# Patient Record
Sex: MALE | Race: WHITE | NOT HISPANIC OR LATINO | Employment: UNEMPLOYED | URBAN - METROPOLITAN AREA
[De-identification: names, ages, dates, MRNs, and addresses within clinical notes are randomized per-mention and may not be internally consistent; named-entity substitution may affect disease eponyms.]

---

## 2018-10-17 ENCOUNTER — HOSPITAL ENCOUNTER (EMERGENCY)
Facility: HOSPITAL | Age: 23
Discharge: HOME | End: 2018-10-17
Attending: EMERGENCY MEDICINE
Payer: COMMERCIAL

## 2018-10-17 VITALS
BODY MASS INDEX: 33.53 KG/M2 | HEART RATE: 79 BPM | HEIGHT: 77 IN | RESPIRATION RATE: 20 BRPM | OXYGEN SATURATION: 97 % | WEIGHT: 284 LBS | TEMPERATURE: 97.4 F | SYSTOLIC BLOOD PRESSURE: 128 MMHG | DIASTOLIC BLOOD PRESSURE: 90 MMHG

## 2018-10-17 DIAGNOSIS — Z00.8 EVALUATION BY PSYCHIATRIC SERVICE REQUIRED: Primary | ICD-10-CM

## 2018-10-17 DIAGNOSIS — F32.A DEPRESSION: ICD-10-CM

## 2018-10-17 LAB
AMPHETAMINES SERPL QL SCN: NEGATIVE
BARBITURATES UR QL: NEGATIVE
BENZODIAZ UR QL: NEGATIVE
COCAINE UR QL: NEGATIVE
ETHANOL SERPL-MCNC: <10 MG/DL
METHADONE UR QL: NEGATIVE
OPIATES UR QL SCN: NEGATIVE
PCP UR QL: NEGATIVE
THC UR QL: NEGATIVE

## 2018-10-17 PROCEDURE — 36415 COLL VENOUS BLD VENIPUNCTURE: CPT | Performed by: EMERGENCY MEDICINE

## 2018-10-17 PROCEDURE — 80307 DRUG TEST PRSMV CHEM ANLYZR: CPT | Performed by: EMERGENCY MEDICINE

## 2018-10-17 PROCEDURE — 80320 DRUG SCREEN QUANTALCOHOLS: CPT | Performed by: EMERGENCY MEDICINE

## 2018-10-17 PROCEDURE — 99284 EMERGENCY DEPT VISIT MOD MDM: CPT

## 2018-10-17 NOTE — ED PROVIDER NOTES
History  Chief Complaint   Patient presents with    Psychiatric Evaluation     I had court today and for me to get out of halfway, I needed to get evaluated for mental health  Patient presents the emergency department for psychiatric evaluation he reports that he was locked up today and 1 condition of his release was that he needed to present to the hospital and determine if he needed psychiatric treatment  The patient states he does not feel he needs psychiatric treatment at this time he reports feeling psychiatrically stable he denies any active severe depression anxiety suicidal or homicidal ideation at this time  History provided by:  Patient and relative  Psychiatric Evaluation   Presenting symptoms: no suicidal thoughts    Patient accompanied by:  Parent  Degree of incapacity (severity):  Mild  Onset quality:  Sudden  Duration:  1 day  Timing:  Constant  Chronicity:  New  Associated symptoms: no abdominal pain, no appetite change, no chest pain, no fatigue and no headaches        None       History reviewed  No pertinent past medical history  History reviewed  No pertinent surgical history  History reviewed  No pertinent family history  I have reviewed and agree with the history as documented  Social History   Substance Use Topics    Smoking status: Never Smoker    Smokeless tobacco: Never Used    Alcohol use No        Review of Systems   Constitutional: Negative for activity change, appetite change, chills, fatigue and fever  HENT: Negative for congestion, ear pain, rhinorrhea and sore throat  Eyes: Negative for discharge, redness and visual disturbance  Respiratory: Negative for cough, chest tightness, shortness of breath and wheezing  Cardiovascular: Negative for chest pain and palpitations  Gastrointestinal: Negative for abdominal pain, constipation, diarrhea, nausea and vomiting  Endocrine: Negative for polydipsia and polyuria     Genitourinary: Negative for difficulty urinating, dysuria, frequency, hematuria and urgency  Musculoskeletal: Negative for arthralgias and myalgias  Skin: Negative for color change, pallor and rash  Neurological: Negative for dizziness, weakness, light-headedness, numbness and headaches  Hematological: Negative for adenopathy  Does not bruise/bleed easily  Psychiatric/Behavioral: Negative for suicidal ideas  All other systems reviewed and are negative  Physical Exam  Physical Exam   Constitutional: He is oriented to person, place, and time  He appears well-developed and well-nourished  HENT:   Head: Normocephalic and atraumatic  Right Ear: External ear normal    Left Ear: External ear normal    Nose: Nose normal    Mouth/Throat: Oropharynx is clear and moist    Eyes: Pupils are equal, round, and reactive to light  Conjunctivae and EOM are normal    Neck: Normal range of motion  Neck supple  Cardiovascular: Normal rate, regular rhythm, normal heart sounds and intact distal pulses  Pulmonary/Chest: Effort normal and breath sounds normal  No respiratory distress  He has no wheezes  He has no rales  He exhibits no tenderness  Abdominal: Soft  Bowel sounds are normal  He exhibits no distension  There is no tenderness  There is no guarding  Musculoskeletal: Normal range of motion  Neurological: He is alert and oriented to person, place, and time  No cranial nerve deficit or sensory deficit  Skin: Skin is warm and dry  Psychiatric: He has a normal mood and affect  Nursing note and vitals reviewed        Vital Signs  ED Triage Vitals [10/17/18 1929]   Temperature Pulse Respirations Blood Pressure SpO2   (!) 97 4 °F (36 3 °C) 79 20 128/90 97 %      Temp Source Heart Rate Source Patient Position - Orthostatic VS BP Location FiO2 (%)   Temporal Monitor Sitting Left arm --      Pain Score       --           Vitals:    10/17/18 1929   BP: 128/90   Pulse: 79   Patient Position - Orthostatic VS: Sitting       Visual Acuity      ED Medications  Medications - No data to display    Diagnostic Studies  Results Reviewed     Procedure Component Value Units Date/Time    Ethanol [[id number]]  (Normal) Collected:  10/17/18 2008    Lab Status:  Final result Specimen:  Blood Updated:  10/17/18 2034     Ethanol Lvl <10 mg/dL     Rapid drug screen, urine [[id number]]  (Normal) Collected:  10/17/18 2008    Lab Status:  Final result Specimen:  Urine Updated:  10/17/18 2024     Amph/Meth UR Negative     Barbiturate Ur Negative     Benzodiazepine Urine Negative     Cocaine Urine Negative     Methadone Urine Negative     Opiate Urine Negative     PCP Ur Negative     THC Urine Negative    Narrative:         FOR MEDICAL PURPOSES ONLY  IF CONFIRMATION NEEDED PLEASE CONTACT THE LAB WITHIN 5 DAYS  Drug Screen Cutoff Levels:  AMPHETAMINE/METHAMPHETAMINES  1000 ng/mL  BARBITURATES     200 ng/mL  BENZODIAZEPINES     200 ng/mL  COCAINE      300 ng/mL  METHADONE      300 ng/mL  OPIATES      300 ng/mL  PHENCYCLIDINE     25 ng/mL  THC       50 ng/mL                 No orders to display              Procedures  Procedures       Phone Contacts  ED Phone Contact    ED Course                               MDM  Number of Diagnoses or Management Options  Depression: established and improving  Evaluation by psychiatric service required: new and requires workup  Diagnosis management comments: Patient has no grounds for 302 present he was seen and evaluated by crisis and provided with referrals for outpatient psychiatric care and cleared for discharge by crisis no grounds for inpatient psychiatric treatment or 302 are present at this time the patient does not wish to seek inpatient psychiatric treatment at this time patient and family are in agreement with plan and will follow up with primary care physician and outpatient psychiatric referrals provided return precautions and anticipatory guidance discussed           Amount and/or Complexity of Data Reviewed  Clinical lab tests: ordered and reviewed      CritCare Time    Disposition  Final diagnoses:   Evaluation by psychiatric service required   Depression     Time reflects when diagnosis was documented in both MDM as applicable and the Disposition within this note     Time User Action Codes Description Comment    10/17/2018  8:30 PM Chevy Piper Add [Z00 8] Evaluation by psychiatric service required     10/17/2018  8:49 PM Chevy Piper Add [F32 9] Depression       ED Disposition     ED Disposition Condition Comment    Discharge  Arabella Shea discharge to home/self care  Condition at discharge: Stable        Follow-up Information     Follow up With Specialties Details Why Contact Info    Nadira Mcpherson DO Family Medicine Schedule an appointment as soon as possible for a visit in 2 days  [address] [facility] [address] [facility] [phone]            There are no discharge medications for this patient  No discharge procedures on file      ED Provider  Electronically Signed by           Maricruz Hernandez DO  10/17/18 7546

## 2018-10-18 NOTE — DISCHARGE INSTRUCTIONS
Depression   WHAT YOU NEED TO KNOW:   Depression is a medical condition that causes feelings of sadness or hopelessness that do not go away  Depression may cause you to lose interest in things you used to enjoy  These feelings may interfere with your daily life  DISCHARGE INSTRUCTIONS:   Call 911 for any of the following:   · You think about harming yourself or someone else  Contact your healthcare provider if:   · Your symptoms do not improve  · You cannot make it to your next appointment  · You have new symptoms  · You have questions or concerns about your condition or care  Medicines:   · Antidepressants  may be given to improve or balance your mood  You may need to take this medicine for several weeks before you begin to feel better  · Take your medicine as directed  Contact your healthcare provider if you think your medicine is not helping or if you have side effects  Tell him of her if you are allergic to any medicine  Keep a list of the medicines, vitamins, and herbs you take  Include the amounts, and when and why you take them  Bring the list or the pill bottles to follow-up visits  Carry your medicine list with you in case of an emergency  Therapy  may be used to treat your depression  A therapist will help you learn to cope with your thoughts and feelings  This can be done alone or in a group  It may also be done with family members or a significant other  Self-care:   · Get regular physical activity  Try to exercise for 30 minutes, 3 to 5 days a week  Work with your healthcare provider to develop an exercise plan that you enjoy  Physical activity may improve your symptoms  · Get enough sleep  Create a routine to help you relax before bed  You can listen to music, read, or do yoga  Try to go to bed and wake up at the same time every day  Sleep is important for emotional health  · Eat a variety of healthy foods from all of the food groups    A healthy meal plan is low in fat, salt, and added sugar  Ask your healthcare provider for more information about a meal plan that is right for you  · Do not drink alcohol or use drugs  Alcohol and drugs can make your symptoms worse  Follow up with your healthcare provider as directed: Your healthcare provider will monitor your progress at follow-up visits  He or she will also monitor your medicine if you take antidepressants  Your healthcare provider will ask if the medicine is helping  Tell him or her about any side effects or problems you may have with your medicine  The type or amount of medicine may need to be changed  Write down your questions so you remember to ask them during your visits  © 2017 [employer] Information is for End User's use only and may not be sold, redistributed or otherwise used for commercial purposes  All illustrations and images included in CareNotes® are the copyrighted property of [employer]  or Julian Hernandez  The above information is an  only  It is not intended as medical advice for individual conditions or treatments  Talk to your doctor, nurse or pharmacist before following any medical regimen to see if it is safe and effective for you

## 2018-10-18 NOTE — ED NOTES
Crisis met with Patient and his parents in [facility] ED #9  Patient is a 27 y/o male released [facility] penitentiary earlier today and was ordered to come to the ED for a crisis evaluation  Patient is denying any suicidal/homicidal ideations and denies any hallucinations  Patient denied any depression/anxiety and has been eating/sleeping well  Patient has no other symptoms/complaints at this time  He was incarcerated for 11 days on charges of terroristical threats  He stated he was feeling threatened at the time of the incident  Patient has a history of hospitalizations  His last admit was in 2015 at [facility]   Patient contracted for safety and is requesting to go home with outpatient information  His parents are in agreement with this plan  Case reviewed with ED MD who is also in agreement with discharge

## 2019-02-22 ENCOUNTER — TRANSCRIBE ORDERS (OUTPATIENT)
Dept: ADMINISTRATIVE | Facility: HOSPITAL | Age: 24
End: 2019-02-22

## 2019-09-14 ENCOUNTER — HOSPITAL ENCOUNTER (EMERGENCY)
Facility: HOSPITAL | Age: 24
Discharge: HOME | End: 2019-09-14
Attending: EMERGENCY MEDICINE | Admitting: EMERGENCY MEDICINE
Payer: COMMERCIAL

## 2019-09-14 ENCOUNTER — APPOINTMENT (EMERGENCY)
Dept: CT IMAGING | Facility: HOSPITAL | Age: 24
End: 2019-09-14
Payer: COMMERCIAL

## 2019-09-14 VITALS
BODY MASS INDEX: 35.42 KG/M2 | OXYGEN SATURATION: 96 % | RESPIRATION RATE: 19 BRPM | DIASTOLIC BLOOD PRESSURE: 77 MMHG | HEART RATE: 101 BPM | HEIGHT: 77 IN | TEMPERATURE: 96.9 F | SYSTOLIC BLOOD PRESSURE: 141 MMHG | WEIGHT: 300 LBS

## 2019-09-14 DIAGNOSIS — R11.10 VOMITING: Primary | ICD-10-CM

## 2019-09-14 DIAGNOSIS — N17.9 AKI (ACUTE KIDNEY INJURY) (HCC): ICD-10-CM

## 2019-09-14 DIAGNOSIS — E86.0 DEHYDRATION: ICD-10-CM

## 2019-09-14 DIAGNOSIS — R19.7 DIARRHEA: ICD-10-CM

## 2019-09-14 LAB
ALBUMIN SERPL BCP-MCNC: 3.2 G/DL (ref 3.5–5)
ALBUMIN SERPL BCP-MCNC: 4.2 G/DL (ref 3.5–5)
ALP SERPL-CCNC: 68 U/L (ref 46–116)
ALP SERPL-CCNC: 92 U/L (ref 46–116)
ALT SERPL W P-5'-P-CCNC: 21 U/L (ref 12–78)
ALT SERPL W P-5'-P-CCNC: 28 U/L (ref 12–78)
AMPHETAMINES SERPL QL SCN: NEGATIVE
ANION GAP SERPL CALCULATED.3IONS-SCNC: 14 MMOL/L (ref 4–13)
ANION GAP SERPL CALCULATED.3IONS-SCNC: 7 MMOL/L (ref 4–13)
AST SERPL W P-5'-P-CCNC: 15 U/L (ref 5–45)
AST SERPL W P-5'-P-CCNC: 21 U/L (ref 5–45)
BACTERIA UR QL AUTO: ABNORMAL /HPF
BARBITURATES UR QL: NEGATIVE
BASOPHILS # BLD AUTO: 0.03 THOUSANDS/ΜL (ref 0–0.1)
BASOPHILS # BLD AUTO: 0.04 THOUSANDS/ΜL (ref 0–0.1)
BASOPHILS NFR BLD AUTO: 0 % (ref 0–1)
BASOPHILS NFR BLD AUTO: 0 % (ref 0–1)
BENZODIAZ UR QL: NEGATIVE
BILIRUB SERPL-MCNC: 0.3 MG/DL (ref 0.2–1)
BILIRUB SERPL-MCNC: 0.4 MG/DL (ref 0.2–1)
BILIRUB UR QL STRIP: NEGATIVE
BUN SERPL-MCNC: 29 MG/DL (ref 5–25)
BUN SERPL-MCNC: 32 MG/DL (ref 5–25)
CALCIUM SERPL-MCNC: 8.1 MG/DL (ref 8.3–10.1)
CALCIUM SERPL-MCNC: 9.8 MG/DL (ref 8.3–10.1)
CHLORIDE SERPL-SCNC: 104 MMOL/L (ref 100–108)
CHLORIDE SERPL-SCNC: 110 MMOL/L (ref 100–108)
CLARITY UR: CLEAR
CO2 SERPL-SCNC: 21 MMOL/L (ref 21–32)
CO2 SERPL-SCNC: 25 MMOL/L (ref 21–32)
COCAINE UR QL: NEGATIVE
COLOR UR: YELLOW
CREAT SERPL-MCNC: 1.83 MG/DL (ref 0.6–1.3)
CREAT SERPL-MCNC: 2.05 MG/DL (ref 0.6–1.3)
EOSINOPHIL # BLD AUTO: 0.03 THOUSAND/ΜL (ref 0–0.61)
EOSINOPHIL # BLD AUTO: 0.18 THOUSAND/ΜL (ref 0–0.61)
EOSINOPHIL NFR BLD AUTO: 0 % (ref 0–6)
EOSINOPHIL NFR BLD AUTO: 2 % (ref 0–6)
ERYTHROCYTE [DISTWIDTH] IN BLOOD BY AUTOMATED COUNT: 13.8 % (ref 11.6–15.1)
ERYTHROCYTE [DISTWIDTH] IN BLOOD BY AUTOMATED COUNT: 13.8 % (ref 11.6–15.1)
GFR SERPL CREATININE-BSD FRML MDRD: 45 ML/MIN/1.73SQ M
GFR SERPL CREATININE-BSD FRML MDRD: 51 ML/MIN/1.73SQ M
GLUCOSE SERPL-MCNC: 104 MG/DL (ref 65–140)
GLUCOSE SERPL-MCNC: 156 MG/DL (ref 65–140)
GLUCOSE UR STRIP-MCNC: NEGATIVE MG/DL
HCT VFR BLD AUTO: 46.2 % (ref 36.5–49.3)
HCT VFR BLD AUTO: 53.4 % (ref 36.5–49.3)
HGB BLD-MCNC: 14.8 G/DL (ref 12–17)
HGB BLD-MCNC: 17.6 G/DL (ref 12–17)
HGB UR QL STRIP.AUTO: NEGATIVE
IMM GRANULOCYTES # BLD AUTO: 0.02 THOUSAND/UL (ref 0–0.2)
IMM GRANULOCYTES # BLD AUTO: 0.03 THOUSAND/UL (ref 0–0.2)
IMM GRANULOCYTES NFR BLD AUTO: 0 % (ref 0–2)
IMM GRANULOCYTES NFR BLD AUTO: 0 % (ref 0–2)
KETONES UR STRIP-MCNC: NEGATIVE MG/DL
LEUKOCYTE ESTERASE UR QL STRIP: NEGATIVE
LIPASE SERPL-CCNC: 128 U/L (ref 73–393)
LYMPHOCYTES # BLD AUTO: 0.5 THOUSANDS/ΜL (ref 0.6–4.47)
LYMPHOCYTES # BLD AUTO: 1.25 THOUSANDS/ΜL (ref 0.6–4.47)
LYMPHOCYTES NFR BLD AUTO: 11 % (ref 14–44)
LYMPHOCYTES NFR BLD AUTO: 6 % (ref 14–44)
MCH RBC QN AUTO: 27.8 PG (ref 26.8–34.3)
MCH RBC QN AUTO: 27.9 PG (ref 26.8–34.3)
MCHC RBC AUTO-ENTMCNC: 32 G/DL (ref 31.4–37.4)
MCHC RBC AUTO-ENTMCNC: 33 G/DL (ref 31.4–37.4)
MCV RBC AUTO: 85 FL (ref 82–98)
MCV RBC AUTO: 87 FL (ref 82–98)
METHADONE UR QL: NEGATIVE
MONOCYTES # BLD AUTO: 0.52 THOUSAND/ΜL (ref 0.17–1.22)
MONOCYTES # BLD AUTO: 0.64 THOUSAND/ΜL (ref 0.17–1.22)
MONOCYTES NFR BLD AUTO: 6 % (ref 4–12)
MONOCYTES NFR BLD AUTO: 6 % (ref 4–12)
NEUTROPHILS # BLD AUTO: 7.87 THOUSANDS/ΜL (ref 1.85–7.62)
NEUTROPHILS # BLD AUTO: 9.07 THOUSANDS/ΜL (ref 1.85–7.62)
NEUTS SEG NFR BLD AUTO: 81 % (ref 43–75)
NEUTS SEG NFR BLD AUTO: 88 % (ref 43–75)
NITRITE UR QL STRIP: NEGATIVE
NON-SQ EPI CELLS URNS QL MICRO: ABNORMAL /HPF
NRBC BLD AUTO-RTO: 0 /100 WBCS
NRBC BLD AUTO-RTO: 0 /100 WBCS
OPIATES UR QL SCN: NEGATIVE
PCP UR QL: NEGATIVE
PH UR STRIP.AUTO: 5.5 [PH]
PLATELET # BLD AUTO: 250 THOUSANDS/UL (ref 149–390)
PLATELET # BLD AUTO: 356 THOUSANDS/UL (ref 149–390)
PMV BLD AUTO: 10 FL (ref 8.9–12.7)
PMV BLD AUTO: 10.1 FL (ref 8.9–12.7)
POTASSIUM SERPL-SCNC: 4 MMOL/L (ref 3.5–5.3)
POTASSIUM SERPL-SCNC: 5.2 MMOL/L (ref 3.5–5.3)
PROT SERPL-MCNC: 6.7 G/DL (ref 6.4–8.2)
PROT SERPL-MCNC: 8.6 G/DL (ref 6.4–8.2)
PROT UR STRIP-MCNC: ABNORMAL MG/DL
RBC # BLD AUTO: 5.33 MILLION/UL (ref 3.88–5.62)
RBC # BLD AUTO: 6.31 MILLION/UL (ref 3.88–5.62)
RBC #/AREA URNS AUTO: ABNORMAL /HPF
SODIUM SERPL-SCNC: 139 MMOL/L (ref 136–145)
SODIUM SERPL-SCNC: 142 MMOL/L (ref 136–145)
SP GR UR STRIP.AUTO: 1.01 (ref 1–1.03)
THC UR QL: NEGATIVE
UROBILINOGEN UR QL STRIP.AUTO: 0.2 E.U./DL
WBC # BLD AUTO: 11.21 THOUSAND/UL (ref 4.31–10.16)
WBC # BLD AUTO: 8.97 THOUSAND/UL (ref 4.31–10.16)
WBC #/AREA URNS AUTO: ABNORMAL /HPF

## 2019-09-14 PROCEDURE — 93005 ELECTROCARDIOGRAM TRACING: CPT

## 2019-09-14 PROCEDURE — 83690 ASSAY OF LIPASE: CPT

## 2019-09-14 PROCEDURE — 85025 COMPLETE CBC W/AUTO DIFF WBC: CPT | Performed by: EMERGENCY MEDICINE

## 2019-09-14 PROCEDURE — 80053 COMPREHEN METABOLIC PANEL: CPT | Performed by: EMERGENCY MEDICINE

## 2019-09-14 PROCEDURE — 74177 CT ABD & PELVIS W/CONTRAST: CPT

## 2019-09-14 PROCEDURE — 80307 DRUG TEST PRSMV CHEM ANLYZR: CPT | Performed by: EMERGENCY MEDICINE

## 2019-09-14 PROCEDURE — 96361 HYDRATE IV INFUSION ADD-ON: CPT

## 2019-09-14 PROCEDURE — 81001 URINALYSIS AUTO W/SCOPE: CPT | Performed by: EMERGENCY MEDICINE

## 2019-09-14 PROCEDURE — 99284 EMERGENCY DEPT VISIT MOD MDM: CPT | Performed by: EMERGENCY MEDICINE

## 2019-09-14 PROCEDURE — 96374 THER/PROPH/DIAG INJ IV PUSH: CPT

## 2019-09-14 PROCEDURE — 80053 COMPREHEN METABOLIC PANEL: CPT

## 2019-09-14 PROCEDURE — 99284 EMERGENCY DEPT VISIT MOD MDM: CPT

## 2019-09-14 PROCEDURE — 36415 COLL VENOUS BLD VENIPUNCTURE: CPT

## 2019-09-14 PROCEDURE — 85025 COMPLETE CBC W/AUTO DIFF WBC: CPT

## 2019-09-14 RX ORDER — ONDANSETRON 4 MG/1
4 TABLET, ORALLY DISINTEGRATING ORAL EVERY 6 HOURS PRN
Qty: 20 TABLET | Refills: 0 | Status: SHIPPED | OUTPATIENT
Start: 2019-09-14 | End: 2021-04-29

## 2019-09-14 RX ORDER — DICYCLOMINE HCL 20 MG
20 TABLET ORAL ONCE
Status: COMPLETED | OUTPATIENT
Start: 2019-09-14 | End: 2019-09-14

## 2019-09-14 RX ORDER — SERTRALINE HYDROCHLORIDE 25 MG/1
25 TABLET, FILM COATED ORAL DAILY
COMMUNITY
End: 2021-07-07

## 2019-09-14 RX ORDER — DICYCLOMINE HCL 20 MG
20 TABLET ORAL 2 TIMES DAILY
Qty: 20 TABLET | Refills: 0 | Status: SHIPPED | OUTPATIENT
Start: 2019-09-14 | End: 2021-04-29

## 2019-09-14 RX ORDER — ONDANSETRON 2 MG/ML
4 INJECTION INTRAMUSCULAR; INTRAVENOUS ONCE
Status: COMPLETED | OUTPATIENT
Start: 2019-09-14 | End: 2019-09-14

## 2019-09-14 RX ADMIN — SODIUM CHLORIDE 1000 ML: 0.9 INJECTION, SOLUTION INTRAVENOUS at 19:23

## 2019-09-14 RX ADMIN — ONDANSETRON 4 MG: 2 INJECTION INTRAMUSCULAR; INTRAVENOUS at 17:01

## 2019-09-14 RX ADMIN — IOHEXOL 100 ML: 350 INJECTION, SOLUTION INTRAVENOUS at 17:50

## 2019-09-14 RX ADMIN — SODIUM CHLORIDE 1000 ML: 0.9 INJECTION, SOLUTION INTRAVENOUS at 17:37

## 2019-09-14 RX ADMIN — DICYCLOMINE HYDROCHLORIDE 20 MG: 20 TABLET ORAL at 19:14

## 2019-09-14 RX ADMIN — SODIUM CHLORIDE 1000 ML: 0.9 INJECTION, SOLUTION INTRAVENOUS at 17:05

## 2019-09-14 NOTE — ED PROVIDER NOTES
Pt Name: Marcie Freedman  MRN: [id number]  [place] 1995  Age/Sex: 27 y o  male  Date of evaluation: 9/14/2019  PCP: Jeramy Jo DO    CHIEF COMPLAINT    Chief Complaint   Patient presents with    Vomiting     c/o vomiting since yesterday  diarhea started today  unable to keep fluids in  lower mid abdominal pain          HPI    Diontebonifacio Collins presents to the Emergency Department complaining of nausea/ vomiting and diarrhea since yesterday  He now has some abdominal pain as well  Bartholome Pinks History provided by:  Patient  Vomiting   Severity:  Moderate  Progression:  Worsening  Recent urination:  Normal  Relieved by:  Nothing  Worsened by:  Nothing  Associated symptoms: abdominal pain and diarrhea    Associated symptoms: no chills, no fever, no headaches and no sore throat    Risk factors: no alcohol use, no diabetes, no prior abdominal surgery, no sick contacts, no suspect food intake and no travel to endemic areas          Past Medical and Surgical History    History reviewed  No pertinent past medical history  History reviewed  No pertinent surgical history  History reviewed  No pertinent family history  Social History     Tobacco Use    Smoking status: Never Smoker    Smokeless tobacco: Never Used   Substance Use Topics    Alcohol use: No    Drug use: No              Allergies    No Known Allergies    Home Medications    Prior to Admission medications    Not on File           Review of Systems    Review of Systems   Constitutional: Negative for activity change, appetite change, chills, fatigue and fever  HENT: Negative for congestion, rhinorrhea, sinus pressure, sneezing, sore throat and trouble swallowing  Eyes: Negative for photophobia and visual disturbance  Respiratory: Negative for chest tightness, shortness of breath and wheezing  Cardiovascular: Negative for chest pain and leg swelling  Gastrointestinal: Positive for abdominal pain, diarrhea, nausea and vomiting   Negative for abdominal distention and constipation  Endocrine: Negative for polydipsia, polyphagia and polyuria  Genitourinary: Negative for decreased urine volume, difficulty urinating, dysuria, flank pain, frequency and urgency  Musculoskeletal: Negative for back pain, gait problem, joint swelling and neck pain  Skin: Negative for color change, pallor and rash  Allergic/Immunologic: Negative for immunocompromised state  Neurological: Negative for seizures, syncope, speech difficulty, weakness, light-headedness and headaches  Psychiatric/Behavioral: Negative for confusion  All other systems reviewed and are negative  Physical Exam      ED Triage Vitals [09/14/19 1642]   Temperature Pulse Respirations Blood Pressure SpO2   (!) 96 9 °F (36 1 °C) (!) 114 (!) 23 142/85 98 %      Temp Source Heart Rate Source Patient Position - Orthostatic VS BP Location FiO2 (%)   Temporal Monitor Lying Right arm --      Pain Score       6               Physical Exam   Constitutional: He is oriented to person, place, and time  He appears well-developed and well-nourished  No distress  HENT:   Head: Normocephalic and atraumatic  Nose: Nose normal    Mouth/Throat: Oropharynx is clear and moist    Eyes: Pupils are equal, round, and reactive to light  Conjunctivae and EOM are normal    Neck: Normal range of motion  Neck supple  Cardiovascular: Normal rate, regular rhythm and normal heart sounds  Exam reveals no gallop and no friction rub  No murmur heard  Pulmonary/Chest: Effort normal and breath sounds normal  No respiratory distress  He has no wheezes  He has no rales  Abdominal: Soft  Bowel sounds are normal  There is generalized tenderness  There is no rebound and no guarding  Musculoskeletal: Normal range of motion  Neurological: He is alert and oriented to person, place, and time  Skin: Skin is warm and dry  He is not diaphoretic  Psychiatric: He has a normal mood and affect   His behavior is normal    Nursing note and vitals reviewed  Assessment and Plan    Alix Molina is a 27 y o  male who presents with nausea/ vomiting/diarrhea and abdominal pain  Physical examination remarkable for diffuse abdominal pain  Differential diagnosis (not completely inclusive) includes viral vs intra-abdominal causes of symptoms  Plan will be to perform diagnostic testing and treat symptomatically  MDM  Number of Diagnoses or Management Options  LINO (acute kidney injury) ([facility] [address] ):   Dehydration:   Diarrhea:   Vomiting:   Diagnosis management comments: Patient presents with history, physical exam and diagnostics consistent with acute gastroenteritis  Patient treated with IV fluids and anti-emetics as needed  Patient much improved and is tolerating po  Will discharge patient with anti-emetics/anti-spasmodics as needed and follow up with primary care provider  I had a long discussion with the patient about aggressive hydration and repeat labs as an outpatient  He is otherwise healthy and should be able to bounce back quickly from his LINO but he will need to have this confirmed  He and his mother expressed understanding and agreed  Diagnostic Results      EKG INTERPRETATION  EKG Interpretation    Rate: 123 BPM  Rhythm: sinus  Axis: normal  ST segments: nonspecific    Impression: abnormal EKG  EKG for comparison: not immediately available  EKG interpreted by me  Interpretation by Eitan Burroughs DO  EKG reviewed and interpreted independently      Labs:    Results for orders placed or performed during the hospital encounter of 09/14/19   CBC and differential   Result Value Ref Range    WBC 11 21 (H) 4 31 - 10 16 Thousand/uL    RBC 6 31 (H) 3 88 - 5 62 Million/uL    Hemoglobin 17 6 (H) 12 0 - 17 0 g/dL    Hematocrit 53 4 (H) 36 5 - 49 3 %    MCV 85 82 - 98 fL    MCH 27 9 26 8 - 34 3 pg    MCHC 33 0 31 4 - 37 4 g/dL    RDW 13 8 11 6 - 15 1 %    MPV 10 1 8 9 - 12 7 fL    Platelets 582 331 - 132 Thousands/uL nRBC 0 /100 WBCs    Neutrophils Relative 81 (H) 43 - 75 %    Immat GRANS % 0 0 - 2 %    Lymphocytes Relative 11 (L) 14 - 44 %    Monocytes Relative 6 4 - 12 %    Eosinophils Relative 2 0 - 6 %    Basophils Relative 0 0 - 1 %    Neutrophils Absolute 9 07 (H) 1 85 - 7 62 Thousands/µL    Immature Grans Absolute 0 03 0 00 - 0 20 Thousand/uL    Lymphocytes Absolute 1 25 0 60 - 4 47 Thousands/µL    Monocytes Absolute 0 64 0 17 - 1 22 Thousand/µL    Eosinophils Absolute 0 18 0 00 - 0 61 Thousand/µL    Basophils Absolute 0 04 0 00 - 0 10 Thousands/µL   Comprehensive metabolic panel   Result Value Ref Range    Sodium 139 136 - 145 mmol/L    Potassium 4 0 3 5 - 5 3 mmol/L    Chloride 104 100 - 108 mmol/L    CO2 21 21 - 32 mmol/L    ANION GAP 14 (H) 4 - 13 mmol/L    BUN 32 (H) 5 - 25 mg/dL    Creatinine 2 05 (H) 0 60 - 1 30 mg/dL    Glucose 156 (H) 65 - 140 mg/dL    Calcium 9 8 8 3 - 10 1 mg/dL    AST 21 5 - 45 U/L    ALT 28 12 - 78 U/L    Alkaline Phosphatase 92 46 - 116 U/L    Total Protein 8 6 (H) 6 4 - 8 2 g/dL    Albumin 4 2 3 5 - 5 0 g/dL    Total Bilirubin 0 30 0 20 - 1 00 mg/dL    eGFR 45 ml/min/1 73sq m   Lipase   Result Value Ref Range    Lipase 128 73 - 393 u/L   Rapid drug screen, urine   Result Value Ref Range    Amph/Meth UR Negative Negative    Barbiturate Ur Negative Negative    Benzodiazepine Urine Negative Negative    Cocaine Urine Negative Negative    Methadone Urine Negative Negative    Opiate Urine Negative Negative    PCP Ur Negative Negative    THC Urine Negative Negative   UA w Reflex to Microscopic w Reflex to Culture   Result Value Ref Range    Color, UA Yellow     Clarity, UA Clear     Specific [place], UA 1 010 1 003 - 1 030    pH, UA 5 5 4 5, 5 0, 5 5, 6 0, 6 5, 7 0, 7 5, 8 0    Leukocytes, UA Negative Negative    Nitrite, UA Negative Negative    Protein, UA 30 (1+) (A) Negative mg/dl    Glucose, UA Negative Negative mg/dl    Ketones, UA Negative Negative mg/dl    Urobilinogen, UA 0 2 0 2, 1 0 E U /dl E U /dl    Bilirubin, UA Negative Negative    Blood, UA Negative Negative   CBC and differential   Result Value Ref Range    WBC 8 97 4 31 - 10 16 Thousand/uL    RBC 5 33 3 88 - 5 62 Million/uL    Hemoglobin 14 8 12 0 - 17 0 g/dL    Hematocrit 46 2 36 5 - 49 3 %    MCV 87 82 - 98 fL    MCH 27 8 26 8 - 34 3 pg    MCHC 32 0 31 4 - 37 4 g/dL    RDW 13 8 11 6 - 15 1 %    MPV 10 0 8 9 - 12 7 fL    Platelets 045 085 - 652 Thousands/uL    nRBC 0 /100 WBCs    Neutrophils Relative 88 (H) 43 - 75 %    Immat GRANS % 0 0 - 2 %    Lymphocytes Relative 6 (L) 14 - 44 %    Monocytes Relative 6 4 - 12 %    Eosinophils Relative 0 0 - 6 %    Basophils Relative 0 0 - 1 %    Neutrophils Absolute 7 87 (H) 1 85 - 7 62 Thousands/µL    Immature Grans Absolute 0 02 0 00 - 0 20 Thousand/uL    Lymphocytes Absolute 0 50 (L) 0 60 - 4 47 Thousands/µL    Monocytes Absolute 0 52 0 17 - 1 22 Thousand/µL    Eosinophils Absolute 0 03 0 00 - 0 61 Thousand/µL    Basophils Absolute 0 03 0 00 - 0 10 Thousands/µL   Comprehensive metabolic panel   Result Value Ref Range    Sodium 142 136 - 145 mmol/L    Potassium 5 2 3 5 - 5 3 mmol/L    Chloride 110 (H) 100 - 108 mmol/L    CO2 25 21 - 32 mmol/L    ANION GAP 7 4 - 13 mmol/L    BUN 29 (H) 5 - 25 mg/dL    Creatinine 1 83 (H) 0 60 - 1 30 mg/dL    Glucose 104 65 - 140 mg/dL    Calcium 8 1 (L) 8 3 - 10 1 mg/dL    AST 15 5 - 45 U/L    ALT 21 12 - 78 U/L    Alkaline Phosphatase 68 46 - 116 U/L    Total Protein 6 7 6 4 - 8 2 g/dL    Albumin 3 2 (L) 3 5 - 5 0 g/dL    Total Bilirubin 0 40 0 20 - 1 00 mg/dL    eGFR 51 ml/min/1 73sq m   Urine Microscopic   Result Value Ref Range    RBC, UA None Seen None Seen, 0-5 /hpf    WBC, UA 1-2 (A) None Seen, 0-5, 5-55, 5-65 /hpf    Epithelial Cells Occasional None Seen, Occasional /hpf    Bacteria, UA Occasional None Seen, Occasional /hpf       All labs reviewed and utilized in the medical decision making process    Radiology:    CT abdomen pelvis with contrast   Final Result   1  Thick-walled proximal jejunal loops and to a lesser extent mid to distal small bowel loops  Fluid-filled colon, though without significant wall thickening  Findings may represent a nonspecific enterocolitis  Clinical correlation recommended  2   Bladder appears thick-walled, though not well distended  Correlate clinically to exclude cystitis  3   Atrophic right kidney  Workstation performed: [id number]             All radiology studies independently viewed by me and interpreted by the radiologist     Procedure    Procedures    [facility]      ED Course of Care and Re-Assessments      Medications   ondansetron (ZOFRAN) injection 4 mg (4 mg Intravenous Given 9/14/19 1701)   sodium chloride 0 9 % bolus 1,000 mL (0 mL Intravenous Stopped 9/14/19 1921)   sodium chloride 0 9 % bolus 1,000 mL (0 mL Intravenous Stopped 9/14/19 1922)   iohexol (OMNIPAQUE) 350 MG/ML injection (MULTI-DOSE) 100 mL (100 mL Intravenous Given 9/14/19 1750)   sodium chloride 0 9 % bolus 1,000 mL (0 mL Intravenous Stopped 9/14/19 2016)   dicyclomine (BENTYL) tablet 20 mg (20 mg Oral Given 9/14/19 1914)           FINAL IMPRESSION    Final diagnoses:   Vomiting   Dehydration   Diarrhea   LINO (acute kidney injury) ([facility] [address] )         DISPOSITION/PLAN    Time reflects when diagnosis was documented in both MDM as applicable and the Disposition within this note     Time User Action Codes Description Comment    9/14/2019  9:07 PM Yolis Bansal L Add [R11 10] Vomiting     9/14/2019  9:07 PM Yolis Bansal L Add [E86 0] Dehydration     9/14/2019  9:07 PM Yolis Bansal L Add [R19 7] Diarrhea     9/14/2019  9:07 PM Yolis Bansal L Add [N17 9] LINO (acute kidney injury) [facility])       ED Disposition     ED Disposition Condition Date/Time Comment    Discharge Stable Sat Sep 14, 2019  9:07 PM Abdoulaye Cardenas discharge to home/self care              Follow-up Information     Follow up With Specialties Details Why Contact Info    Glenn Mills DO Family Medicine Schedule an appointment as soon as possible for a visit  you need to have repeat blood work in 2-3 days to assure that your BUN/ Creat are improving  [facility]  [phone]              PATIENT REFERRED TO:    Kay Edgar DO  [address] [place]  [address]  [facility]  [phone]    Schedule an appointment as soon as possible for a visit   you need to have repeat blood work in 2-3 days to assure that your BUN/ Creat are improving  DISCHARGE MEDICATIONS:    Discharge Medication List as of 9/14/2019  9:14 PM      START taking these medications    Details   dicyclomine (BENTYL) 20 mg tablet Take 1 tablet (20 mg total) by mouth 2 (two) times a day, Starting Sat 9/14/2019, Print      ondansetron (ZOFRAN-ODT) 4 mg disintegrating tablet Take 1 tablet (4 mg total) by mouth every 6 (six) hours as needed for nausea or vomiting, Starting Sat 9/14/2019, Print         CONTINUE these medications which have NOT CHANGED    Details   sertraline (ZOLOFT) 25 mg tablet Take 25 mg by mouth daily, Historical Med             No discharge procedures on file           Yvon Dempsey, [address] [facility], DO  09/14/19 3246

## 2019-09-14 NOTE — ED NOTES
Pt is resting in bed  Call bell within reach  Mother is at bedside  Denies nausea, does not feel lightheaded anymore        Hugh Wade RN  09/14/19 8833

## 2019-09-15 LAB
ATRIAL RATE: 123 BPM
P AXIS: -6 DEGREES
PR INTERVAL: 130 MS
QRS AXIS: -15 DEGREES
QRSD INTERVAL: 94 MS
QT INTERVAL: 326 MS
QTC INTERVAL: 466 MS
T WAVE AXIS: 2 DEGREES
VENTRICULAR RATE: 123 BPM

## 2019-09-15 PROCEDURE — 93010 ELECTROCARDIOGRAM REPORT: CPT | Performed by: INTERNAL MEDICINE

## 2019-09-15 NOTE — ED NOTES
Pt provided with bag lunch and ate 801% with no complications or vomiting  Pt drank 240mL of soda       Timothy Alvarez RN  09/14/19 4604

## 2019-09-19 ENCOUNTER — VBI (OUTPATIENT)
Dept: FAMILY MEDICINE CLINIC | Facility: CLINIC | Age: 24
End: 2019-09-19

## 2019-09-19 NOTE — LETTER
[facility]  [address] [id number] [phone]    Date: 09/20/19    Milla Balderrama  [facility] [address]    Dear Manuel Huang: Thank you for choosing [facility] emergency department for care  As your primary care provider we want to make sure that your ongoing medical care is being addressed  If you require follow up care as a result of your emergency department visit, there are a few things we would like you to know  As part of our continuing commitment to caring for our patient, we have added more same day appointments and have extended our office hours to meet your medical needs  After hours, on-call physicians are available via telephone  We encourage you to contact our office prior to seeking treatment to discuss your symptoms with our medical staff  Together, we can determine the correct course of action  A majority of non-emergent conditions such as: common cold, flu-like symptoms, fevers, strains/sprains, dislocations, minor burns, cuts and animal bites can be treated at [address] [place] [address]  Diagnostic testing is available at some sites  Of course, if you are experiencing a life threatening medical emergency call 911 or proceed directly to the nearest emergency room      Your nearest [address] [place] [address] is conveniently located at:    [address]  [phone]    Sincerely,    [facility]  Dept: [phone]

## 2019-09-19 NOTE — TELEPHONE ENCOUNTER
09/19/2019 04:32 PM Phone ([employer]) Latisha Barnes (Self) [phone] (M)   Left Message  Unable to reach patient regarding recent ED visit on 9/14 for Vomiting; Dehydration; Diarrhea; LINO (acute kidney injury) ([facility] )  Patient was discharged without medication and advised to follow up with PCP  2nd attempt will be made on 9/20 09/20/2019 10:47 AM Phone ([employer]) Cristino Pope (Mother) [phone] (H)   Left Message  Unable to reach patient regarding recent ED visit on 9/14 for Vomiting; Dehydration; Diarrhea; LINO (acute kidney injury) ([facility] )  Patient was discharged without medication and advised to follow up with PCP  Letter generated and mailed

## 2021-04-02 ENCOUNTER — APPOINTMENT (OUTPATIENT)
Dept: LAB | Facility: HOSPITAL | Age: 26
End: 2021-04-02
Payer: COMMERCIAL

## 2021-04-02 ENCOUNTER — TRANSCRIBE ORDERS (OUTPATIENT)
Dept: ADMINISTRATIVE | Facility: HOSPITAL | Age: 26
End: 2021-04-02

## 2021-04-02 DIAGNOSIS — R73.03 PRE-DIABETES: ICD-10-CM

## 2021-04-02 DIAGNOSIS — E66.3 OVERWEIGHT: ICD-10-CM

## 2021-04-02 DIAGNOSIS — R63.5 WEIGHT GAIN: Primary | ICD-10-CM

## 2021-04-02 DIAGNOSIS — R63.5 WEIGHT GAIN: ICD-10-CM

## 2021-04-02 LAB
ANION GAP SERPL CALCULATED.3IONS-SCNC: 11 MMOL/L (ref 4–13)
BUN SERPL-MCNC: 25 MG/DL (ref 5–25)
CALCIUM SERPL-MCNC: 9 MG/DL (ref 8.3–10.1)
CHLORIDE SERPL-SCNC: 107 MMOL/L (ref 100–108)
CHOLEST SERPL-MCNC: 237 MG/DL (ref 50–200)
CO2 SERPL-SCNC: 26 MMOL/L (ref 21–32)
CREAT SERPL-MCNC: 2.23 MG/DL (ref 0.6–1.3)
EST. AVERAGE GLUCOSE BLD GHB EST-MCNC: 111 MG/DL
GFR SERPL CREATININE-BSD FRML MDRD: 40 ML/MIN/1.73SQ M
GLUCOSE P FAST SERPL-MCNC: 106 MG/DL (ref 65–99)
HBA1C MFR BLD: 5.5 %
HDLC SERPL-MCNC: 37 MG/DL
LDLC SERPL CALC-MCNC: 159 MG/DL (ref 0–100)
NONHDLC SERPL-MCNC: 200 MG/DL
POTASSIUM SERPL-SCNC: 4.4 MMOL/L (ref 3.5–5.3)
SODIUM SERPL-SCNC: 144 MMOL/L (ref 136–145)
TRIGL SERPL-MCNC: 207 MG/DL
TSH SERPL DL<=0.05 MIU/L-ACNC: 14.32 UIU/ML (ref 0.36–3.74)

## 2021-04-02 PROCEDURE — 36415 COLL VENOUS BLD VENIPUNCTURE: CPT

## 2021-04-02 PROCEDURE — 80061 LIPID PANEL: CPT

## 2021-04-02 PROCEDURE — 80048 BASIC METABOLIC PNL TOTAL CA: CPT

## 2021-04-02 PROCEDURE — 84443 ASSAY THYROID STIM HORMONE: CPT

## 2021-04-02 PROCEDURE — 83036 HEMOGLOBIN GLYCOSYLATED A1C: CPT

## 2021-04-23 ENCOUNTER — TRANSCRIBE ORDERS (OUTPATIENT)
Dept: ADMINISTRATIVE | Facility: HOSPITAL | Age: 26
End: 2021-04-23

## 2021-04-23 ENCOUNTER — APPOINTMENT (OUTPATIENT)
Dept: LAB | Facility: HOSPITAL | Age: 26
End: 2021-04-23
Payer: COMMERCIAL

## 2021-04-23 DIAGNOSIS — E03.9 HYPOTHYROIDISM, UNSPECIFIED TYPE: Primary | ICD-10-CM

## 2021-04-23 DIAGNOSIS — E03.9 HYPOTHYROIDISM, UNSPECIFIED TYPE: ICD-10-CM

## 2021-04-23 DIAGNOSIS — N28.9 KIDNEY FUNCTION ABNORMAL: ICD-10-CM

## 2021-04-23 LAB
ALBUMIN SERPL BCP-MCNC: 3.6 G/DL (ref 3.5–5)
ALP SERPL-CCNC: 79 U/L (ref 46–116)
ALT SERPL W P-5'-P-CCNC: 61 U/L (ref 12–78)
ANION GAP SERPL CALCULATED.3IONS-SCNC: 11 MMOL/L (ref 4–13)
AST SERPL W P-5'-P-CCNC: 24 U/L (ref 5–45)
BACTERIA UR QL AUTO: NORMAL /HPF
BILIRUB SERPL-MCNC: 0.22 MG/DL (ref 0.2–1)
BILIRUB UR QL STRIP: NEGATIVE
BUN SERPL-MCNC: 33 MG/DL (ref 5–25)
CALCIUM SERPL-MCNC: 9.4 MG/DL (ref 8.3–10.1)
CHLORIDE SERPL-SCNC: 107 MMOL/L (ref 100–108)
CLARITY UR: CLEAR
CO2 SERPL-SCNC: 27 MMOL/L (ref 21–32)
COLOR UR: ABNORMAL
CREAT SERPL-MCNC: 2 MG/DL (ref 0.6–1.3)
GFR SERPL CREATININE-BSD FRML MDRD: 45 ML/MIN/1.73SQ M
GLUCOSE P FAST SERPL-MCNC: 109 MG/DL (ref 65–99)
GLUCOSE UR STRIP-MCNC: NEGATIVE MG/DL
HGB UR QL STRIP.AUTO: NEGATIVE
KETONES UR STRIP-MCNC: NEGATIVE MG/DL
LEUKOCYTE ESTERASE UR QL STRIP: NEGATIVE
NITRITE UR QL STRIP: NEGATIVE
NON-SQ EPI CELLS URNS QL MICRO: NORMAL /HPF
PH UR STRIP.AUTO: 6 [PH]
POTASSIUM SERPL-SCNC: 4.4 MMOL/L (ref 3.5–5.3)
PROT SERPL-MCNC: 7.6 G/DL (ref 6.4–8.2)
PROT UR STRIP-MCNC: ABNORMAL MG/DL
RBC #/AREA URNS AUTO: NORMAL /HPF
SODIUM SERPL-SCNC: 145 MMOL/L (ref 136–145)
SP GR UR STRIP.AUTO: 1.02 (ref 1–1.03)
T4 SERPL-MCNC: 8.8 UG/DL (ref 4.7–13.3)
UROBILINOGEN UR QL STRIP.AUTO: 0.2 E.U./DL
WBC #/AREA URNS AUTO: NORMAL /HPF

## 2021-04-23 PROCEDURE — 81001 URINALYSIS AUTO W/SCOPE: CPT

## 2021-04-23 PROCEDURE — 36415 COLL VENOUS BLD VENIPUNCTURE: CPT

## 2021-04-23 PROCEDURE — 84436 ASSAY OF TOTAL THYROXINE: CPT

## 2021-04-23 PROCEDURE — 80053 COMPREHEN METABOLIC PANEL: CPT

## 2021-04-29 ENCOUNTER — CONSULT (OUTPATIENT)
Dept: NEPHROLOGY | Facility: CLINIC | Age: 26
End: 2021-04-29
Payer: COMMERCIAL

## 2021-04-29 VITALS
TEMPERATURE: 98 F | OXYGEN SATURATION: 98 % | BODY MASS INDEX: 37.19 KG/M2 | HEART RATE: 90 BPM | HEIGHT: 77 IN | WEIGHT: 315 LBS | DIASTOLIC BLOOD PRESSURE: 84 MMHG | SYSTOLIC BLOOD PRESSURE: 136 MMHG

## 2021-04-29 DIAGNOSIS — N18.31 STAGE 3A CHRONIC KIDNEY DISEASE (HCC): ICD-10-CM

## 2021-04-29 DIAGNOSIS — R80.9 PROTEINURIA, UNSPECIFIED TYPE: ICD-10-CM

## 2021-04-29 DIAGNOSIS — N17.9 AKI (ACUTE KIDNEY INJURY) (HCC): Primary | ICD-10-CM

## 2021-04-29 LAB
SL AMB  POCT GLUCOSE, UA: NEGATIVE
SL AMB LEUKOCYTE ESTERASE,UA: NEGATIVE
SL AMB POCT BILIRUBIN,UA: NEGATIVE
SL AMB POCT BLOOD,UA: NEGATIVE
SL AMB POCT CLARITY,UA: CLEAR
SL AMB POCT COLOR,UA: ABNORMAL
SL AMB POCT KETONES,UA: NEGATIVE
SL AMB POCT NITRITE,UA: NEGATIVE
SL AMB POCT PH,UA: 6
SL AMB POCT SPECIFIC GRAVITY,UA: 1.03
SL AMB POCT URINE PROTEIN: ABNORMAL
SL AMB POCT UROBILINOGEN: 0.2

## 2021-04-29 PROCEDURE — 81002 URINALYSIS NONAUTO W/O SCOPE: CPT | Performed by: INTERNAL MEDICINE

## 2021-04-29 PROCEDURE — 99244 OFF/OP CNSLTJ NEW/EST MOD 40: CPT | Performed by: INTERNAL MEDICINE

## 2021-04-29 NOTE — PROGRESS NOTES
[facility] [place], [place]    Name: Melany Francis  YOB: 1995      Assessment/Plan:    Acute kidney injury    Patient most likely does not have acute kidney injury but rather has chronic sustained kidney damage from loss of renal mass, with potential other underlying issues  Please refer below  Will continue to avoid potential nephrotoxic medications and other substances which may cause or potentially cause kidney abnormalities  Chronic kidney disease stage IIIA    Patient certainly has reduction of kidney mass which explains reduction in kidney function  However, the patient also has a very large individual, he is about 6 ft 5 weighs 375 lb  The estimated GFR is likely under presenting is true kidney function, however, there has certainly been a true increase in his creatinine from around 1 up to around 2 mg/dL  Will need to have further evaluation, please refer below  Proteinuria    Patient will require full serologic workup, will confirm and quantify proteinuria and check additional serologies to rule out other potential underlying conditions  Patient will ultimately benefit from a kidney biopsy so long as he truly has proteinuria            Problem List Items Addressed This Visit     None      Visit Diagnoses     LINO (acute kidney injury) ([facility] )    -  Primary    Relevant Orders    POCT urine dip (Completed)    Microalbumin / creatinine urine ratio    Protein / creatinine ratio, urine    US kidney and bladder    PTH, intact    JUHI BY MULTIPLEX IMMUNOASSAY,REFLEX TO 5-BIOMARKER PROFILE    Anticardiolipin Ab, IgG/M, Qn    ASO Screen w/ Reflex to Titer    C3 complement    C4 complement    Cryoglobulin    Glomerular basement membrane antibodies    HIV 1/2 Antigen/Antibody (4th Generation) w Reflex [facility]    Chronic Hepatitis Panel    Vitamin D 25 hydroxy    Protein electrophoresis, serum    Protein electrophoresis, urine    Stage 3a chronic kidney disease (HCC)        Proteinuria, unspecified type        Relevant Orders    Microalbumin / creatinine urine ratio    Protein / creatinine ratio, urine    US kidney and bladder    PTH, intact    JUHI BY MULTIPLEX IMMUNOASSAY,REFLEX TO 5-BIOMARKER PROFILE    Anticardiolipin Ab, IgG/M, Qn    ASO Screen w/ Reflex to Titer    C3 complement    C4 complement    Cryoglobulin    Glomerular basement membrane antibodies    HIV 1/2 Antigen/Antibody (4th Generation) w Reflex [facility]    Chronic Hepatitis Panel    Vitamin D 25 hydroxy    Protein electrophoresis, serum    Protein electrophoresis, urine          Thank you for allowing us to participate in the care of your patient  Patient will have workup as noted above and then we will see him in the office for a regular appointment approximately 2 months  We will be in communication with the patient as labs, imaging, and biopsy results come in  Subjective:      Patient ID: Charlotte Boland is a 27 y o  male  Patient presents for evaluation due to elevated creatinine  Patient claims this was 1st noted on blood work recently done, however, after more thorough review of the electronic medical records, it was noted that he has had reduced kidney function for the last several years  More specifically, the patient's last blood work from 2014 noted a normal creatinine, around 1 mg/dL, the next set of labs are from 2019 which noted creatinine around 2 mg/dL  4/2/21 9/14/19 9/14/19 11/8/14     Creatinine      2 23           1 83             2 05     1 00  eGFR                              40               51         45      Patient is on over any specific issue between 2014 in 2019 which may have caused an abnormality in his kidney function  Further evaluations wrist electronic medical records noted a CT scan the abdomen pelvis from 2019 which showed that he has an atrophic right kidney    No other imaging studies are available of the kidneys prior to this  Patient had a urinalysis in September of 2019 which was positive for proteinuria, no quantification was able to be obtained at that time  The following portions of the patient's history were reviewed and updated as appropriate: allergies, current medications, past family history, past medical history, past social history, past surgical history and problem list     Review of Systems      Social History     Socioeconomic History    Marital status: Single     Spouse name: None    Number of children: None    Years of education: None    Highest education level: None   Occupational History    None   Social Needs    Financial resource strain: None    Food insecurity     Worry: None     Inability: None    Transportation needs     Medical: None     Non-medical: None   Tobacco Use    Smoking status: Never Smoker    Smokeless tobacco: Never Used   Substance and Sexual Activity    Alcohol use: No    Drug use: No    Sexual activity: None   Lifestyle    Physical activity     Days per week: None     Minutes per session: None    Stress: None   Relationships    Social connections     Talks on phone: None     Gets together: None     Attends Yarsanism service: None     Active member of club or organization: None     Attends meetings of clubs or organizations: None     Relationship status: None    Intimate partner violence     Fear of current or ex partner: None     Emotionally abused: None     Physically abused: None     Forced sexual activity: None   Other Topics Concern    None   Social History Narrative    None     History reviewed  No pertinent past medical history  History reviewed  No pertinent surgical history      Current Outpatient Medications:     sertraline (ZOLOFT) 25 mg tablet, Take 25 mg by mouth daily, Disp: , Rfl:     Lab Results   Component Value Date     (H) 11/08/2014    SODIUM 145 04/23/2021    K 4 4 04/23/2021     04/23/2021    CO2 27 04/23/2021 ANIONGAP 9 11/08/2014    AGAP 11 04/23/2021    BUN 33 (H) 04/23/2021    CREATININE 2 00 (H) 04/23/2021    GLUC 104 09/14/2019    GLUF 109 (H) 04/23/2021    CALCIUM 9 4 04/23/2021    AST 24 04/23/2021    ALT 61 04/23/2021    ALKPHOS 79 04/23/2021    PROT 6 7 11/08/2014    TP 7 6 04/23/2021    BILITOT 0 2 11/08/2014    TBILI 0 22 04/23/2021    EGFR 45 04/23/2021     Lab Results   Component Value Date    WBC 8 97 09/14/2019    HGB 14 8 09/14/2019    HCT 46 2 09/14/2019    MCV 87 09/14/2019     09/14/2019     Lab Results   Component Value Date    CHOLESTEROL 237 (H) 04/02/2021     Lab Results   Component Value Date    HDL 37 (L) 04/02/2021     Lab Results   Component Value Date    LDLCALC 159 (H) 04/02/2021     Lab Results   Component Value Date    TRIG 207 (H) 04/02/2021     No results found for: [place], [place]  Lab Results   Component Value Date    ETT5CGQHJEQK 14 321 (H) 04/02/2021     Lab Results   Component Value Date    CALCIUM 9 4 04/23/2021     No results found for: SPEP, UPEP  No results found for: AMARILIS JOHNSON        Objective:      /84 (BP Location: Left arm, Patient Position: Sitting, Cuff Size: Large)   Pulse 90   Temp 98 °F (36 7 °C) (Temporal)   Ht 6' 5" (1 956 m)   Wt (!) 170 kg (375 lb)   SpO2 98%   BMI 44 47 kg/m²          Physical Exam

## 2021-05-04 ENCOUNTER — APPOINTMENT (OUTPATIENT)
Dept: LAB | Facility: HOSPITAL | Age: 26
End: 2021-05-04
Attending: INTERNAL MEDICINE
Payer: COMMERCIAL

## 2021-05-04 ENCOUNTER — HOSPITAL ENCOUNTER (OUTPATIENT)
Dept: ULTRASOUND IMAGING | Facility: HOSPITAL | Age: 26
Discharge: HOME | End: 2021-05-04
Attending: INTERNAL MEDICINE
Payer: COMMERCIAL

## 2021-05-04 DIAGNOSIS — R80.9 PROTEINURIA, UNSPECIFIED TYPE: ICD-10-CM

## 2021-05-04 DIAGNOSIS — N17.9 AKI (ACUTE KIDNEY INJURY) (HCC): ICD-10-CM

## 2021-05-04 LAB
25(OH)D3 SERPL-MCNC: 17.4 NG/ML (ref 30–100)
C3 SERPL-MCNC: 152 MG/DL (ref 90–180)
C4 SERPL-MCNC: 32 MG/DL (ref 10–40)
CREAT UR-MCNC: 102 MG/DL
CREAT UR-MCNC: 102 MG/DL
HBV CORE AB SER QL: NORMAL
HBV CORE IGM SER QL: NORMAL
HBV SURFACE AG SER QL: NORMAL
HCV AB SER QL: NORMAL
MICROALBUMIN UR-MCNC: 1420 MG/L (ref 0–20)
MICROALBUMIN/CREAT 24H UR: 1392 MG/G CREATININE (ref 0–30)
PROT UR-MCNC: 172 MG/DL
PROT/CREAT UR: 1.69 MG/G{CREAT} (ref 0–0.1)
PTH-INTACT SERPL-MCNC: 149.7 PG/ML (ref 18.4–80.1)

## 2021-05-04 PROCEDURE — 82043 UR ALBUMIN QUANTITATIVE: CPT

## 2021-05-04 PROCEDURE — 82570 ASSAY OF URINE CREATININE: CPT

## 2021-05-04 PROCEDURE — 84165 PROTEIN E-PHORESIS SERUM: CPT

## 2021-05-04 PROCEDURE — 84156 ASSAY OF PROTEIN URINE: CPT

## 2021-05-04 PROCEDURE — 87340 HEPATITIS B SURFACE AG IA: CPT

## 2021-05-04 PROCEDURE — 86038 ANTINUCLEAR ANTIBODIES: CPT

## 2021-05-04 PROCEDURE — 84166 PROTEIN E-PHORESIS/URINE/CSF: CPT | Performed by: PATHOLOGY

## 2021-05-04 PROCEDURE — 82595 ASSAY OF CRYOGLOBULIN: CPT

## 2021-05-04 PROCEDURE — 82306 VITAMIN D 25 HYDROXY: CPT

## 2021-05-04 PROCEDURE — 36415 COLL VENOUS BLD VENIPUNCTURE: CPT

## 2021-05-04 PROCEDURE — 86063 ANTISTREPTOLYSIN O SCREEN: CPT

## 2021-05-04 PROCEDURE — 86803 HEPATITIS C AB TEST: CPT

## 2021-05-04 PROCEDURE — 86705 HEP B CORE ANTIBODY IGM: CPT

## 2021-05-04 PROCEDURE — 84166 PROTEIN E-PHORESIS/URINE/CSF: CPT

## 2021-05-04 PROCEDURE — 86160 COMPLEMENT ANTIGEN: CPT

## 2021-05-04 PROCEDURE — 83520 IMMUNOASSAY QUANT NOS NONAB: CPT

## 2021-05-04 PROCEDURE — 83970 ASSAY OF PARATHORMONE: CPT

## 2021-05-04 PROCEDURE — 76770 US EXAM ABDO BACK WALL COMP: CPT

## 2021-05-04 PROCEDURE — 87389 HIV-1 AG W/HIV-1&-2 AB AG IA: CPT

## 2021-05-04 PROCEDURE — 86704 HEP B CORE ANTIBODY TOTAL: CPT

## 2021-05-04 PROCEDURE — 84165 PROTEIN E-PHORESIS SERUM: CPT | Performed by: PATHOLOGY

## 2021-05-04 PROCEDURE — 86147 CARDIOLIPIN ANTIBODY EA IG: CPT

## 2021-05-05 DIAGNOSIS — E55.9 VITAMIN D DEFICIENCY: Primary | ICD-10-CM

## 2021-05-05 LAB
ASO AB TITR SER LA: NORMAL {TITER}
CARDIOLIPIN IGA SER IA-ACNC: 1.1
CARDIOLIPIN IGG SER IA-ACNC: 2.2
CARDIOLIPIN IGM SER IA-ACNC: <0.8
HIV 1+2 AB+HIV1 P24 AG SERPL QL IA: NORMAL
RYE IGE QN: NEGATIVE

## 2021-05-05 RX ORDER — ERGOCALCIFEROL (VITAMIN D2) 1250 MCG
50000 CAPSULE ORAL WEEKLY
Qty: 4 CAPSULE | Refills: 5 | Status: SHIPPED | OUTPATIENT
Start: 2021-05-05 | End: 2022-07-13

## 2021-05-06 LAB
ALBUMIN SERPL ELPH-MCNC: 4.02 G/DL (ref 3.5–5)
ALBUMIN SERPL ELPH-MCNC: 60 % (ref 52–65)
ALBUMIN UR ELPH-MCNC: 83 %
ALPHA1 GLOB MFR UR ELPH: 3.5 %
ALPHA1 GLOB SERPL ELPH-MCNC: 0.27 G/DL (ref 0.1–0.4)
ALPHA1 GLOB SERPL ELPH-MCNC: 4 % (ref 2.5–5)
ALPHA2 GLOB MFR UR ELPH: 3.8 %
ALPHA2 GLOB SERPL ELPH-MCNC: 0.88 G/DL (ref 0.4–1.2)
ALPHA2 GLOB SERPL ELPH-MCNC: 13.2 % (ref 7–13)
B-GLOBULIN MFR UR ELPH: 4.8 %
BETA GLOB ABNORMAL SERPL ELPH-MCNC: 0.38 G/DL (ref 0.4–0.8)
BETA1 GLOB SERPL ELPH-MCNC: 5.6 % (ref 5–13)
BETA2 GLOB SERPL ELPH-MCNC: 5.9 % (ref 2–8)
BETA2+GAMMA GLOB SERPL ELPH-MCNC: 0.4 G/DL (ref 0.2–0.5)
GAMMA GLOB ABNORMAL SERPL ELPH-MCNC: 0.76 G/DL (ref 0.5–1.6)
GAMMA GLOB MFR UR ELPH: 4.9 %
GAMMA GLOB SERPL ELPH-MCNC: 11.3 % (ref 12–22)
GBM AB SER IA-ACNC: 3 UNITS (ref 0–20)
IGG/ALB SER: 1.5 {RATIO} (ref 1.1–1.8)
PROT PATTERN SERPL ELPH-IMP: ABNORMAL
PROT PATTERN UR ELPH-IMP: ABNORMAL
PROT SERPL-MCNC: 6.7 G/DL (ref 6.4–8.2)
PROT UR-MCNC: 172 MG/DL

## 2021-05-11 LAB — CRYOGLOB SER QL 1D COLD INC: POSITIVE

## 2021-05-13 ENCOUNTER — PREP FOR PROCEDURE (OUTPATIENT)
Dept: INTERVENTIONAL RADIOLOGY/VASCULAR | Facility: CLINIC | Age: 26
End: 2021-05-13

## 2021-05-13 ENCOUNTER — TELEPHONE (OUTPATIENT)
Dept: NEPHROLOGY | Facility: CLINIC | Age: 26
End: 2021-05-13

## 2021-05-13 ENCOUNTER — TELEPHONE (OUTPATIENT)
Dept: INTERVENTIONAL RADIOLOGY/VASCULAR | Facility: HOSPITAL | Age: 26
End: 2021-05-13

## 2021-05-13 DIAGNOSIS — N18.9 CHRONIC KIDNEY DISEASE, UNSPECIFIED CKD STAGE: Primary | ICD-10-CM

## 2021-05-13 DIAGNOSIS — N18.31 STAGE 3A CHRONIC KIDNEY DISEASE (HCC): Primary | ICD-10-CM

## 2021-05-13 DIAGNOSIS — R80.9 PROTEINURIA, UNSPECIFIED TYPE: ICD-10-CM

## 2021-05-13 PROBLEM — N17.9 AKI (ACUTE KIDNEY INJURY) (HCC): Status: ACTIVE | Noted: 2021-05-13

## 2021-05-13 NOTE — TELEPHONE ENCOUNTER
Forms completed and faxed to [facility] for kidney biopsy  Called and left message on voicemail that forms were sent and could they call us back to let us know they got them  Patient is aware someone will be calling him to schedule biopsy and that he should have CBC done a day or 2 prior to the biopsy

## 2021-05-13 NOTE — PROGRESS NOTES
Spoke with patient to set up kidney biopsy  Appointment was made for 5/25/21 at the [address]   Plan to arrive for 0800, have a ride to and from, and NPO after midnight the night prior  Answered all questions, consult complete

## 2021-05-24 ENCOUNTER — APPOINTMENT (OUTPATIENT)
Dept: LAB | Facility: HOSPITAL | Age: 26
End: 2021-05-24
Attending: INTERNAL MEDICINE
Payer: COMMERCIAL

## 2021-05-24 DIAGNOSIS — R80.9 PROTEINURIA, UNSPECIFIED TYPE: ICD-10-CM

## 2021-05-24 DIAGNOSIS — N18.31 STAGE 3A CHRONIC KIDNEY DISEASE (HCC): ICD-10-CM

## 2021-05-24 LAB
BASOPHILS # BLD AUTO: 0.08 THOUSANDS/ΜL (ref 0–0.1)
BASOPHILS NFR BLD AUTO: 1 % (ref 0–1)
EOSINOPHIL # BLD AUTO: 0.22 THOUSAND/ΜL (ref 0–0.61)
EOSINOPHIL NFR BLD AUTO: 3 % (ref 0–6)
ERYTHROCYTE [DISTWIDTH] IN BLOOD BY AUTOMATED COUNT: 14.1 % (ref 11.6–15.1)
HCT VFR BLD AUTO: 46 % (ref 36.5–49.3)
HGB BLD-MCNC: 14.7 G/DL (ref 12–17)
IMM GRANULOCYTES # BLD AUTO: 0.02 THOUSAND/UL (ref 0–0.2)
IMM GRANULOCYTES NFR BLD AUTO: 0 % (ref 0–2)
LYMPHOCYTES # BLD AUTO: 2.34 THOUSANDS/ΜL (ref 0.6–4.47)
LYMPHOCYTES NFR BLD AUTO: 33 % (ref 14–44)
MCH RBC QN AUTO: 26.9 PG (ref 26.8–34.3)
MCHC RBC AUTO-ENTMCNC: 32 G/DL (ref 31.4–37.4)
MCV RBC AUTO: 84 FL (ref 82–98)
MONOCYTES # BLD AUTO: 0.53 THOUSAND/ΜL (ref 0.17–1.22)
MONOCYTES NFR BLD AUTO: 8 % (ref 4–12)
NEUTROPHILS # BLD AUTO: 3.88 THOUSANDS/ΜL (ref 1.85–7.62)
NEUTS SEG NFR BLD AUTO: 55 % (ref 43–75)
NRBC BLD AUTO-RTO: 0 /100 WBCS
PLATELET # BLD AUTO: 322 THOUSANDS/UL (ref 149–390)
PMV BLD AUTO: 9.5 FL (ref 8.9–12.7)
RBC # BLD AUTO: 5.46 MILLION/UL (ref 3.88–5.62)
WBC # BLD AUTO: 7.07 THOUSAND/UL (ref 4.31–10.16)

## 2021-05-24 PROCEDURE — 36415 COLL VENOUS BLD VENIPUNCTURE: CPT

## 2021-05-24 PROCEDURE — 85025 COMPLETE CBC W/AUTO DIFF WBC: CPT

## 2021-05-25 ENCOUNTER — HOSPITAL ENCOUNTER (OUTPATIENT)
Dept: CT IMAGING | Facility: HOSPITAL | Age: 26
Discharge: HOME | End: 2021-05-25
Attending: RADIOLOGY
Payer: COMMERCIAL

## 2021-05-25 VITALS
WEIGHT: 315 LBS | HEIGHT: 77 IN | RESPIRATION RATE: 16 BRPM | HEART RATE: 88 BPM | DIASTOLIC BLOOD PRESSURE: 84 MMHG | BODY MASS INDEX: 37.19 KG/M2 | SYSTOLIC BLOOD PRESSURE: 141 MMHG | OXYGEN SATURATION: 100 % | TEMPERATURE: 98.6 F

## 2021-05-25 DIAGNOSIS — N18.9 CHRONIC KIDNEY DISEASE, UNSPECIFIED CKD STAGE: ICD-10-CM

## 2021-05-25 PROCEDURE — 99153 MOD SED SAME PHYS/QHP EA: CPT

## 2021-05-25 PROCEDURE — 50200 RENAL BIOPSY PERQ: CPT | Performed by: RADIOLOGY

## 2021-05-25 PROCEDURE — 99152 MOD SED SAME PHYS/QHP 5/>YRS: CPT | Performed by: RADIOLOGY

## 2021-05-25 PROCEDURE — 88348 ELECTRON MICROSCOPY DX: CPT | Performed by: RADIOLOGY

## 2021-05-25 PROCEDURE — 77012 CT SCAN FOR NEEDLE BIOPSY: CPT | Performed by: RADIOLOGY

## 2021-05-25 PROCEDURE — 77012 CT SCAN FOR NEEDLE BIOPSY: CPT

## 2021-05-25 PROCEDURE — 88313 SPECIAL STAINS GROUP 2: CPT | Performed by: RADIOLOGY

## 2021-05-25 PROCEDURE — 88305 TISSUE EXAM BY PATHOLOGIST: CPT | Performed by: RADIOLOGY

## 2021-05-25 PROCEDURE — 50200 RENAL BIOPSY PERQ: CPT

## 2021-05-25 PROCEDURE — 88350 IMFLUOR EA ADDL 1ANTB STN PX: CPT | Performed by: RADIOLOGY

## 2021-05-25 PROCEDURE — 88346 IMFLUOR 1ST 1ANTB STAIN PX: CPT | Performed by: RADIOLOGY

## 2021-05-25 PROCEDURE — 99152 MOD SED SAME PHYS/QHP 5/>YRS: CPT

## 2021-05-25 RX ORDER — FENTANYL CITRATE 50 UG/ML
INJECTION, SOLUTION INTRAMUSCULAR; INTRAVENOUS CODE/TRAUMA/SEDATION MEDICATION
Status: COMPLETED | OUTPATIENT
Start: 2021-05-25 | End: 2021-05-25

## 2021-05-25 RX ORDER — ONDANSETRON 2 MG/ML
INJECTION INTRAMUSCULAR; INTRAVENOUS CODE/TRAUMA/SEDATION MEDICATION
Status: COMPLETED | OUTPATIENT
Start: 2021-05-25 | End: 2021-05-25

## 2021-05-25 RX ORDER — SODIUM CHLORIDE 9 MG/ML
50 INJECTION, SOLUTION INTRAVENOUS CONTINUOUS
Status: DISCONTINUED | OUTPATIENT
Start: 2021-05-25 | End: 2021-05-26 | Stop reason: HOSPADM

## 2021-05-25 RX ORDER — MIDAZOLAM HYDROCHLORIDE 2 MG/2ML
INJECTION, SOLUTION INTRAMUSCULAR; INTRAVENOUS CODE/TRAUMA/SEDATION MEDICATION
Status: COMPLETED | OUTPATIENT
Start: 2021-05-25 | End: 2021-05-25

## 2021-05-25 RX ORDER — LIDOCAINE WITH 8.4% SOD BICARB 0.9%(10ML)
SYRINGE (ML) INJECTION CODE/TRAUMA/SEDATION MEDICATION
Status: COMPLETED | OUTPATIENT
Start: 2021-05-25 | End: 2021-05-25

## 2021-05-25 RX ADMIN — ONDANSETRON HYDROCHLORIDE 4 MG: 2 INJECTION, SOLUTION INTRAVENOUS at 08:51

## 2021-05-25 RX ADMIN — MIDAZOLAM HYDROCHLORIDE 0.5 MG: 1 INJECTION, SOLUTION INTRAMUSCULAR; INTRAVENOUS at 08:54

## 2021-05-25 RX ADMIN — FENTANYL CITRATE 50 MCG: 50 INJECTION, SOLUTION INTRAMUSCULAR; INTRAVENOUS at 09:14

## 2021-05-25 RX ADMIN — SODIUM CHLORIDE 50 ML/HR: 0.9 INJECTION, SOLUTION INTRAVENOUS at 08:30

## 2021-05-25 RX ADMIN — Medication 10 ML: at 09:04

## 2021-05-25 NOTE — PROGRESS NOTES
Interventional Radiology  History and Physical 5/25/2021     Vaishali Stroud   1995   [id number]    Assessment/Plan:  Plan posterior approach and core biopsy to flap portion of existing left kidney  Plan CT guidance  Plan tract closure with the stent  Patient drank lemonaid last night, and water this morning  We discussed increased risk of aspiration  He is willing to accept this increased risk  Problem List Items Addressed This Visit     None      Visit Diagnoses     Chronic kidney disease, unspecified CKD stage        Relevant Orders    IR biopsy kidney [place] kit no laterality             Subjective:     Patient ID: Vaishali Stroud is a 27 y o  male  History of Present Illness  Functionally solitary left kidney  Right kidney is atrophic, but does enhance  Protein urea  History of elevated creatinine  Review of Systems      Past Medical History:   Diagnosis Date    Depression         History reviewed  No pertinent surgical history       Social History     Tobacco Use   Smoking Status Never Smoker   Smokeless Tobacco Never Used        Social History     Substance and Sexual Activity   Alcohol Use No        Social History     Substance and Sexual Activity   Drug Use No        No Known Allergies    Current Outpatient Medications   Medication Sig Dispense Refill    ergocalciferol (ERGOCALCIFEROL) 1 25 MG (19178 UT) capsule Take 1 capsule (50,000 Units total) by mouth once a week 4 capsule 5    sertraline (ZOLOFT) 25 mg tablet Take 25 mg by mouth daily       Current Facility-Administered Medications   Medication Dose Route Frequency Provider Last Rate Last Admin    sodium chloride 0 9 % infusion  50 mL/hr Intravenous Continuous Silvio Crockett MD 50 mL/hr at 05/25/21 0830 50 mL/hr at 05/25/21 0830          Objective:    Vitals:    05/25/21 0800   BP: 144/81   Pulse: 77   Resp: 16   Temp: 98 6 °F (37 °C)   TempSrc: Tympanic   SpO2: 99%   Weight: (!) 170 kg (375 lb)   Height: 6' 5" (1 956 m) Physical Exam      Regular rate and rhythm  Normal respiratory excursion  The skin over the flank is clean, dry, and intact  No results found for: BNP   Lab Results   Component Value Date    WBC 7 07 05/24/2021    HGB 14 7 05/24/2021    HCT 46 0 05/24/2021    MCV 84 05/24/2021     05/24/2021     No results found for: INR, PROTIME  No results found for: PTT      I have personally reviewed pertinent imaging and laboratory results  Code Status: No Order  Advance Directive and Living Will:      Power of :    POLST:      This text is generated with voice recognition software  There may be translation, syntax,  or grammatical errors  If you have any questions, please contact the dictating provider

## 2021-05-25 NOTE — PROCEDURES
Interventional Radiology Procedure Note    PATIENT NAME: Akin Craig  : 1995  MRN: [id number]     Pre-op Diagnosis:   1  Chronic kidney disease, unspecified CKD stage      2  Proteinuria, atrophy of right kidney    Post-op Diagnosis:   1  Chronic kidney disease, unspecified CKD stage      2     Same    Procedure: CT Guided Biopsy of the left kidney  Surgeon: Rachid Dunn MD  Assistants: No qualified resident was available, Resident is only observing  Estimated Blood Loss: 10 cc  Findings: Please see full report in PACS  Specimens: Please see full report in PACS  Complications: None  Anesthesia: conscious sedation and local  Prep: 2% Chlorhexidine and alcohol, allowed to dry prior to sterile draping  Timeout: Performed  Fluoro time: Please see full report in PACS  Radiation dose: Please see full report in PACS  Contrast dose: Please see full report in PACS  Contrast type: Please see full report in PACS  Contrast strength: Please see full report in PACS  Contrast route of administration: Please see full report in PACS  Antibiotics: None        Rachid Dunn MD     Date: 2021  Time: 9:21 AM

## 2021-05-25 NOTE — DISCHARGE INSTRUCTIONS
[address]  Interventional Radiology  Dr Jose Gallo: [phone] (M-F 7:30am - 4:00pm)  Off hours or no answer: [phone] (Ask for IR on call)          Percutaneous Kidney Biopsy   WHAT YOU NEED TO KNOW:   A percutaneous kidney biopsy is a procedure to remove a small sample of kidney tissue  It may also be done to check for kidney disease or cancer  DISCHARGE INSTRUCTIONS:   Follow up with your healthcare provider as directed:  Write down your questions so you remember to ask them during your visits  Wound care: The Band-Aid may be removed in 24 hours  For more information:   · National Kidney and Urologic Diseases Information Clearinghouse  [address] [place] [address]  Phone: 1- [phone] - [phone]  Web Address: http://kidney  niddk nih gov/   Care after your procedure:    1  Limit your activities for 36 hours after your biopsy  2  No driving day of biopsy  3  Return to your normal diet  Flat sips of flat soda helps with mild nausea  4  Remove band-aid or dressing 24 hours after procedure  Contact Interventional Radiology at [phone]    [place] PATIENTS: Contact Interventional Radiology at [phone]) [place] PATIENTS: Contact Interventional Radiology at [phone]) if:    1  Difficulty breathing, nausea or vomiting  2  You feel weak or dizzy  3  Chills or fever above 101 degrees F  You have persistent nausea or vomiting    4  You have severe pain in your abdomen or where your procedure was done  You feel weak or dizzy  5  You have blood in your urine  You urinate small amounts or not at all  4  Develop any redness, swelling, heat, unusual drainage, heavy bruising or bleeding     from biopsy site

## 2021-06-03 DIAGNOSIS — N05.1 FSGS (FOCAL SEGMENTAL GLOMERULOSCLEROSIS): Primary | ICD-10-CM

## 2021-06-03 LAB — SCAN RESULT: NORMAL

## 2021-06-19 ENCOUNTER — HOSPITAL ENCOUNTER (EMERGENCY)
Facility: HOSPITAL | Age: 26
Discharge: HOME | End: 2021-06-20
Attending: EMERGENCY MEDICINE
Payer: COMMERCIAL

## 2021-06-19 DIAGNOSIS — S93.401A RIGHT ANKLE SPRAIN: Primary | ICD-10-CM

## 2021-06-19 PROCEDURE — 99284 EMERGENCY DEPT VISIT MOD MDM: CPT | Performed by: EMERGENCY MEDICINE

## 2021-06-19 PROCEDURE — 99283 EMERGENCY DEPT VISIT LOW MDM: CPT

## 2021-06-20 ENCOUNTER — APPOINTMENT (EMERGENCY)
Dept: RADIOLOGY | Facility: HOSPITAL | Age: 26
End: 2021-06-20
Payer: COMMERCIAL

## 2021-06-20 VITALS
SYSTOLIC BLOOD PRESSURE: 183 MMHG | RESPIRATION RATE: 18 BRPM | HEART RATE: 97 BPM | DIASTOLIC BLOOD PRESSURE: 88 MMHG | OXYGEN SATURATION: 98 % | TEMPERATURE: 97.2 F

## 2021-06-20 PROCEDURE — 73610 X-RAY EXAM OF ANKLE: CPT

## 2021-06-20 RX ORDER — ACETAMINOPHEN 325 MG/1
650 TABLET ORAL ONCE
Status: COMPLETED | OUTPATIENT
Start: 2021-06-20 | End: 2021-06-20

## 2021-06-20 RX ADMIN — ACETAMINOPHEN 650 MG: 325 TABLET, FILM COATED ORAL at 00:29

## 2021-06-20 NOTE — DISCHARGE INSTRUCTIONS
Stirrup splint for comfort; weight bearing as tolerated  Avoid NSAIDS (ibuprofen(motrin) , naprosyn (Aleve)) as will placed additional strain on your kidneys  Tylenol 650mg every 6 hours as needed for pain, fever (max 3000mg in 24 hours)   Recheck blood pressure with Dr Jayde Cormier

## 2021-06-20 NOTE — ED PROVIDER NOTES
History  Chief Complaint   Patient presents with    Ankle Pain     Pt reports R ankle pain starting this morning  He does not think there was any injury  He states he has been dealing with this pain "off an on" for the past couple years and thought it may be gout  26 Yo male presents with right ankle pain he is complaining of discomfort over the lateral malleolus he denies any falls but he states he may have sprained it because he had some slipping on some wet grass several days ago  He did not sustain a fall  The pain worsened today  He is concerned about the possibility of gout  He denies any calf pain foot pain he has had no numbness or tingling it is worse with movement he does not feels if it gives out  He has had no fever chills no rash no recent tick bites  Prior to Admission Medications   Prescriptions Last Dose Informant Patient Reported? Taking?   ergocalciferol (ERGOCALCIFEROL) 1 25 MG (33997 UT) capsule   No No   Sig: Take 1 capsule (50,000 Units total) by mouth once a week   sertraline (ZOLOFT) 25 mg tablet  Self Yes No   Sig: Take 25 mg by mouth daily      Facility-Administered Medications: None       Past Medical History:   Diagnosis Date    Depression        Past Surgical History:   Procedure Laterality Date    IR BIOPSY KIDNEY [place] KIT NO LATERALITY  5/25/2021       Family History   Problem Relation Age of Onset    Cancer Mother     Diabetes Mother     Hypertension Mother     Obesity Mother     Hypothyroidism Mother     Mental illness Mother     Diabetes Father     Obesity Father     Vascular Disease Father      I have reviewed and agree with the history as documented  E-Cigarette/Vaping     E-Cigarette/Vaping Substances     Social History     Tobacco Use    Smoking status: Never Smoker    Smokeless tobacco: Never Used   Substance Use Topics    Alcohol use: No    Drug use: No       Review of Systems   Constitutional: Negative for chills and fever  Respiratory: Negative for cough and shortness of breath  Cardiovascular: Negative for chest pain and leg swelling  Musculoskeletal: Positive for arthralgias (rt ankle pain)  Negative for myalgias  Skin: Negative for rash and wound  Neurological: Negative for weakness and numbness  All other systems reviewed and are negative  Physical Exam  Physical Exam  Vitals reviewed  Constitutional:       General: He is not in acute distress  Appearance: He is not ill-appearing, toxic-appearing or diaphoretic  Musculoskeletal:         General: Tenderness present  No swelling or deformity  Right knee: Normal       Right lower leg: No edema  Left lower leg: No edema  Right ankle: No swelling, deformity or ecchymosis  Tenderness present over the lateral malleolus  No medial malleolus, ATF ligament, AITF ligament, CF ligament, posterior TF ligament, base of 5th metatarsal or proximal fibula tenderness  Decreased range of motion  Anterior drawer test negative  Normal pulse  Right Achilles Tendon: Normal  No tenderness or defects  Casey's test negative  Right foot: Normal  No tenderness or bony tenderness  Normal pulse  Legs:    Skin:     General: Skin is warm and dry  Capillary Refill: Capillary refill takes less than 2 seconds  Findings: No bruising, erythema or rash  Neurological:      General: No focal deficit present  Mental Status: He is alert  Cranial Nerves: No cranial nerve deficit  Sensory: No sensory deficit  Motor: No weakness        Coordination: Coordination normal       Deep Tendon Reflexes: Reflexes normal    Psychiatric:         Mood and Affect: Mood normal          Vital Signs  ED Triage Vitals [06/19/21 2335]   Temperature Pulse Respirations Blood Pressure SpO2   (!) 97 2 °F (36 2 °C) (!) 120 18 (!) 181/105 98 %      Temp Source Heart Rate Source Patient Position - Orthostatic VS BP Location FiO2 (%)   Temporal Monitor Sitting Left arm --      Pain Score       Worst Possible Pain           Vitals:    06/19/21 2335 06/20/21 0000   BP: (!) 181/105 (!) 183/88   Pulse: (!) 120 97   Patient Position - Orthostatic VS: Sitting Lying         Visual Acuity      ED Medications  Medications   acetaminophen (TYLENOL) tablet 650 mg (650 mg Oral Given 6/20/21 0029)       Diagnostic Studies  Results Reviewed     None                 XR ankle 3+ views RIGHT   ED Interpretation by Cristopher Nazario MD (06/20 2224)   Read by me; Radiologist to provide formal interpretation  No acute fracture                 Procedures  Procedures         ED Course  ED Course as of Jun 20 0136   Sun Jun 20, 2021   0105 Rt ankle stirrup splint placed by me CMS intact after placement  Has pain with weight bearing recommend sulaiman      0105 Reviewed elevated blood pressures recommend follow up with Dr Silvia Lima                                              [facility]  Number of Diagnoses or Management Options  Diagnosis management comments: Mdm:  Patient has no clinical evidence of an effusion no skin your edema no tenderness to the medial aspect reassured patient does not consistent clinically with gout  Will proceed with plain film evaluation to evaluate for fracture will put actively place patient and stirrup splint patient's blood pressure did improve given his history of chronic kidney disease do not recommend NSAIDs recommend Tylenol for management of pain and follow-up with his nephrologist to re-evaluate his blood pressure  Disposition  Final diagnoses:   Right ankle sprain     Time reflects when diagnosis was documented in both MDM as applicable and the Disposition within this note     Time User Action Codes Description Comment    6/20/2021 12:52 AM [place] [employer] Add [[id number]] Right ankle sprain       ED Disposition     ED Disposition Condition Date/Time Comment    Discharge Stable [place] Jun 20, 2021 12:52 AM [place] discharge to home/self care  Follow-up Information     Follow up With Specialties Details Why Contact Info Additional [address] [facility] Specialists [place] [facility] Call in 3 days recheck right ankle pain [address] [facility] [address] [address] Specialists [place] [address] [facility], [place], [place], [address]    Rosa Hodge, [place] [facility] Call in 1 day follow up appointment for recheck of blood pressure [address],  [place] [address] [phone]             Discharge Medication List as of 6/20/2021  1:09 AM      CONTINUE these medications which have NOT CHANGED    Details   ergocalciferol (ERGOCALCIFEROL) 1 25 MG (85553 UT) capsule Take 1 capsule (50,000 Units total) by mouth once a week, Starting Wed 5/5/2021, Normal      sertraline (ZOLOFT) 25 mg tablet Take 25 mg by mouth daily, Historical Med           No discharge procedures on file      PDMP Review     None          ED Provider  Electronically Signed by           Jake Viera MD  06/20/21 4402

## 2021-06-21 ENCOUNTER — TELEPHONE (OUTPATIENT)
Dept: NUTRITION | Facility: HOSPITAL | Age: 26
End: 2021-06-21

## 2021-06-21 NOTE — TELEPHONE ENCOUNTER
Spoke with patient to notify patient his insurance will not cover his visit and he was offer self pay rate  He declined visit  He reports he is losing weight but changing his diet  He went from 400# to 360#  Encourage patient to continue with his wt loss journey

## 2021-07-07 ENCOUNTER — OFFICE VISIT (OUTPATIENT)
Dept: NEPHROLOGY | Facility: CLINIC | Age: 26
End: 2021-07-07
Payer: COMMERCIAL

## 2021-07-07 VITALS
OXYGEN SATURATION: 98 % | HEIGHT: 77 IN | BODY MASS INDEX: 37.19 KG/M2 | WEIGHT: 315 LBS | HEART RATE: 89 BPM | DIASTOLIC BLOOD PRESSURE: 92 MMHG | SYSTOLIC BLOOD PRESSURE: 142 MMHG

## 2021-07-07 DIAGNOSIS — N18.31 STAGE 3A CHRONIC KIDNEY DISEASE (HCC): ICD-10-CM

## 2021-07-07 DIAGNOSIS — E55.9 VITAMIN D DEFICIENCY: ICD-10-CM

## 2021-07-07 DIAGNOSIS — N05.1 FSGS (FOCAL SEGMENTAL GLOMERULOSCLEROSIS): Primary | ICD-10-CM

## 2021-07-07 PROBLEM — N17.9 AKI (ACUTE KIDNEY INJURY) (HCC): Status: RESOLVED | Noted: 2021-05-13 | Resolved: 2021-07-07

## 2021-07-07 PROBLEM — N18.2 CKD (CHRONIC KIDNEY DISEASE) STAGE 2, GFR 60-89 ML/MIN: Status: ACTIVE | Noted: 2021-07-07

## 2021-07-07 PROBLEM — R80.9 PROTEINURIA: Status: RESOLVED | Noted: 2021-05-13 | Resolved: 2021-07-07

## 2021-07-07 PROCEDURE — 99214 OFFICE O/P EST MOD 30 MIN: CPT | Performed by: INTERNAL MEDICINE

## 2021-07-07 RX ORDER — LOSARTAN POTASSIUM 25 MG/1
25 TABLET ORAL DAILY
Qty: 30 TABLET | Refills: 3 | Status: SHIPPED | OUTPATIENT
Start: 2021-07-07 | End: 2022-07-13 | Stop reason: SDUPTHER

## 2021-07-07 NOTE — ASSESSMENT & PLAN NOTE
Continue focus on weight loss, patient is doing a good job here  I asked him to increase physical activity possible to including exercise where he is burning around 400 calories and hour

## 2021-07-07 NOTE — PROGRESS NOTES
[facility], [place]    Name: Robinson Fraga  YOB: 1995      Assessment/Plan:    FSGS (focal segmental glomerulosclerosis)    Continue to lose weight, patient was given positive support for this  Will also initiate the patient on losartan 25 mg once daily  He will keep an eye on his blood pressures and let us know if systolic blood pressures are consistently under 120 mmHg  Blood work will be checked in about 2 or 3 weeks after initiation of this medication  Will continue monitor proteinuria on a monthly basis for now  Stage 3a chronic kidney disease [facility])  Lab Results   Component Value Date    EGFR 45 04/23/2021    EGFR 40 04/02/2021    EGFR 51 09/14/2019    CREATININE 2 00 (H) 04/23/2021    CREATININE 2 23 (H) 04/02/2021    CREATININE 1 83 (H) 09/14/2019       Will follow-up blood work in the very near future, anticipate improvement in creatinine as the patient's underlying condition improves  Otherwise, if the patient maintains a creatinine around 2 mg/ dL will likely check a 24 hour urine creatinine clearance to confirm true function as opposed to the MDRD equation estimated GFR given the patient's current body habitus and body surface area  Vitamin D deficiency    Continue with ergocalciferol once weekly  This will need to continue for the next year or so  BMI 40 0-44 9, adult [facility])    Continue focus on weight loss, patient is doing a good job here  I asked him to increase physical activity possible to including exercise where he is burning around 400 calories and hour           Problem List Items Addressed This Visit        Genitourinary    Stage 3a chronic kidney disease ([address] )     Lab Results   Component Value Date    EGFR 45 04/23/2021    EGFR 40 04/02/2021    EGFR 51 09/14/2019    CREATININE 2 00 (H) 04/23/2021    CREATININE 2 23 (H) 04/02/2021    CREATININE 1 83 (H) 09/14/2019       Will follow-up blood work in the very near future, anticipate improvement in creatinine as the patient's underlying condition improves  Otherwise, if the patient maintains a creatinine around 2 mg/ dL will likely check a 24 hour urine creatinine clearance to confirm true function as opposed to the MDRD equation estimated GFR given the patient's current body habitus and body surface area  FSGS (focal segmental glomerulosclerosis) - Primary       Continue to lose weight, patient was given positive support for this  Will also initiate the patient on losartan 25 mg once daily  He will keep an eye on his blood pressures and let us know if systolic blood pressures are consistently under 120 mmHg  Blood work will be checked in about 2 or 3 weeks after initiation of this medication  Will continue monitor proteinuria on a monthly basis for now  Relevant Medications    losartan (COZAAR) 25 mg tablet    Other Relevant Orders    Comprehensive metabolic panel    Microalbumin / creatinine urine ratio    Protein / creatinine ratio, urine    Urinalysis with microscopic       Other    Vitamin D deficiency       Continue with ergocalciferol once weekly  This will need to continue for the next year or so  BMI 40 0-44 9, adult [facility])       Continue focus on weight loss, patient is doing a good job here  I asked him to increase physical activity possible to including exercise where he is burning around 400 calories and hour  Will check labs toward and July, we will start the patient on losartan help control FSGS due to hyper filtration  We will see the patient back in approximately 6 months for regular appointment  In the meantime he will have monthly labs mostly looking at urine protein to track how well he is improving  Subjective:      Patient ID: Joesph Moore is a 27 y o  male  Patient presents for follow-up        Since we last saw the patient he had a kidney biopsy which showed that has secondary focal segmental glomerular sclerosis due to hyper filtration from obesity  In the meantime, the patient is focused on reducing caloric intake and losing weight in general   He continues to do very well  He was somewhere around 400 lb several months ago, and he is now down to 363 lb  Patient has been taking ergocalciferol for vitamin-D deficiency  He is taking this supplement with no specific side effects at this time  The following portions of the patient's history were reviewed and updated as appropriate: allergies, current medications, past family history, past medical history, past social history, past surgical history and problem list     Review of Systems   All other systems reviewed and are negative  Social History     Socioeconomic History    Marital status: Single     Spouse name: Not on file    Number of children: Not on file    Years of education: Not on file    Highest education level: Not on file   Occupational History    Not on file   Tobacco Use    Smoking status: Never Smoker    Smokeless tobacco: Never Used   Substance and Sexual Activity    Alcohol use: No    Drug use: No    Sexual activity: Not on file   Other Topics Concern    Not on file   Social History Narrative    Not on file     Social Determinants of Health     Financial Resource Strain:     Difficulty of Paying Living Expenses:    Food Insecurity:     Worried About Running Out of Food in the Last Year:     [address] in the Last Year:    Transportation Needs:     Lack of Transportation (Medical):      Lack of Transportation (Non-Medical):    Physical Activity:     Days of Exercise per Week:     Minutes of Exercise per Session:    Stress:     Feeling of Stress :    Social Connections:     Frequency of Communication with Friends and Family:     Frequency of Social Gatherings with Friends and Family:     Attends Orthodoxy Services:     Active Member of Clubs or Organizations:     Attends Club or Organization Meetings:     Marital Status:    Intimate Partner Violence:     Fear of Current or Ex-Partner:     Emotionally Abused:     Physically Abused:     Sexually Abused:      Past Medical History:   Diagnosis Date    Depression      Past Surgical History:   Procedure Laterality Date    IR BIOPSY KIDNEY [place] KIT NO LATERALITY  5/25/2021       Current Outpatient Medications:     ergocalciferol (ERGOCALCIFEROL) 1 25 MG (00164 UT) capsule, Take 1 capsule (50,000 Units total) by mouth once a week, Disp: 4 capsule, Rfl: 5    losartan (COZAAR) 25 mg tablet, Take 1 tablet (25 mg total) by mouth daily, Disp: 30 tablet, Rfl: 3    Lab Results   Component Value Date     (H) 11/08/2014    SODIUM 145 04/23/2021    K 4 4 04/23/2021     04/23/2021    CO2 27 04/23/2021    ANIONGAP 9 11/08/2014    AGAP 11 04/23/2021    BUN 33 (H) 04/23/2021    CREATININE 2 00 (H) 04/23/2021    GLUC 104 09/14/2019    GLUF 109 (H) 04/23/2021    CALCIUM 9 4 04/23/2021    AST 24 04/23/2021    ALT 61 04/23/2021    ALKPHOS 79 04/23/2021    PROT 6 7 11/08/2014    TP 6 7 05/04/2021    BILITOT 0 2 11/08/2014    TBILI 0 22 04/23/2021    EGFR 45 04/23/2021     Lab Results   Component Value Date    WBC 7 07 05/24/2021    HGB 14 7 05/24/2021    HCT 46 0 05/24/2021    MCV 84 05/24/2021     05/24/2021     Lab Results   Component Value Date    CHOLESTEROL 237 (H) 04/02/2021     Lab Results   Component Value Date    HDL 37 (L) 04/02/2021     Lab Results   Component Value Date    LDLCALC 159 (H) 04/02/2021     Lab Results   Component Value Date    TRIG 207 (H) 04/02/2021     No results found for: [place], [place]  Lab Results   Component Value Date    WVV1NWNQRZJY 14 321 (H) 04/02/2021     Lab Results   Component Value Date     7 (H) 05/04/2021    CALCIUM 9 4 04/23/2021     Lab Results   Component Value Date    SPEP See Comment 05/04/2021    UPEP See Comment 05/04/2021     No results found for: AMARILIS JOHNSON        Objective:      BP 142/92   Pulse 89   Ht 6' 5" (1 956 m)   Wt (!) 165 kg (363 lb)   SpO2 98%   BMI 43 05 kg/m²          Physical Exam  Vitals reviewed  Constitutional:       General: He is not in acute distress  Appearance: He is well-developed  HENT:      Head: Normocephalic and atraumatic  Eyes:      Conjunctiva/sclera: Conjunctivae normal    Cardiovascular:      Rate and Rhythm: Normal rate and regular rhythm  Pulmonary:      Effort: Pulmonary effort is normal       Breath sounds: Normal breath sounds  Abdominal:      Palpations: Abdomen is soft  Musculoskeletal:      Cervical back: Neck supple  Skin:     General: Skin is warm  Findings: No rash  Neurological:      Mental Status: He is alert and oriented to person, place, and time  Cranial Nerves: No cranial nerve deficit     Psychiatric:         Behavior: Behavior normal

## 2021-07-07 NOTE — ASSESSMENT & PLAN NOTE
Lab Results   Component Value Date    EGFR 45 04/23/2021    EGFR 40 04/02/2021    EGFR 51 09/14/2019    CREATININE 2 00 (H) 04/23/2021    CREATININE 2 23 (H) 04/02/2021    CREATININE 1 83 (H) 09/14/2019       Will follow-up blood work in the very near future, anticipate improvement in creatinine as the patient's underlying condition improves  Otherwise, if the patient maintains a creatinine around 2 mg/ dL will likely check a 24 hour urine creatinine clearance to confirm true function as opposed to the MDRD equation estimated GFR given the patient's current body habitus and body surface area

## 2021-07-07 NOTE — ASSESSMENT & PLAN NOTE
Continue to lose weight, patient was given positive support for this  Will also initiate the patient on losartan 25 mg once daily  He will keep an eye on his blood pressures and let us know if systolic blood pressures are consistently under 120 mmHg  Blood work will be checked in about 2 or 3 weeks after initiation of this medication  Will continue monitor proteinuria on a monthly basis for now

## 2021-07-29 ENCOUNTER — HOSPITAL ENCOUNTER (EMERGENCY)
Facility: HOSPITAL | Age: 26
Discharge: HOME | End: 2021-07-29
Attending: EMERGENCY MEDICINE
Payer: COMMERCIAL

## 2021-07-29 VITALS
HEART RATE: 97 BPM | DIASTOLIC BLOOD PRESSURE: 75 MMHG | OXYGEN SATURATION: 98 % | RESPIRATION RATE: 18 BRPM | TEMPERATURE: 97.9 F | SYSTOLIC BLOOD PRESSURE: 136 MMHG

## 2021-07-29 DIAGNOSIS — S93.401A SPRAIN OF RIGHT ANKLE, UNSPECIFIED LIGAMENT, INITIAL ENCOUNTER: ICD-10-CM

## 2021-07-29 DIAGNOSIS — G89.29 CHRONIC PAIN OF RIGHT ANKLE: Primary | ICD-10-CM

## 2021-07-29 DIAGNOSIS — M25.571 CHRONIC PAIN OF RIGHT ANKLE: Primary | ICD-10-CM

## 2021-07-29 PROCEDURE — 99282 EMERGENCY DEPT VISIT SF MDM: CPT | Performed by: EMERGENCY MEDICINE

## 2021-07-29 PROCEDURE — 99283 EMERGENCY DEPT VISIT LOW MDM: CPT

## 2021-07-29 NOTE — Clinical Note
Korey Brasher was seen and treated in our emergency department on 7/29/2021  Diagnosis: R ankle sprain/pain    Abram Hung  may return to work on return date  He may return on this date: 07/30/2021         If you have any questions or concerns, please don't hesitate to call        Kayla De La Torre DO    ______________________________           _______________          _______________  Hospital Representative                              Date                                Time

## 2021-07-29 NOTE — ED PROVIDER NOTES
History  Chief Complaint   Patient presents with    Ankle Pain     Pt c/o R ankle pain off and on for the last 5 months  Seen here for same about a month ago  Has not f/u with anyone  Patient is a 61-year-old male who presents for evaluation of right ankle pain  Patient says that he has been having the pain off and on for months    He says that he sprained it a few months ago  He was seen here 1 month ago for the same complaint  He had a negative ankle x-ray at that time  He denies any new injury since being seen 1 month ago  He has been able to put weight on his ankle  He says he wore the brace that was given him for about 1 week  The patient is mainly here because he was in no if it is safe to go to work tonight  Prior to Admission Medications   Prescriptions Last Dose Informant Patient Reported? Taking?   ergocalciferol (ERGOCALCIFEROL) 1 25 MG (99356 UT) capsule 7/29/2021 at Unknown time  No Yes   Sig: Take 1 capsule (50,000 Units total) by mouth once a week   losartan (COZAAR) 25 mg tablet Not Taking at Unknown time  No No   Sig: Take 1 tablet (25 mg total) by mouth daily   Patient not taking: Reported on 7/29/2021      Facility-Administered Medications: None       Past Medical History:   Diagnosis Date    Depression        Past Surgical History:   Procedure Laterality Date    IR BIOPSY KIDNEY [place] KIT NO LATERALITY  5/25/2021       Family History   Problem Relation Age of Onset    Cancer Mother     Diabetes Mother     Hypertension Mother     Obesity Mother     Hypothyroidism Mother     Mental illness Mother     Diabetes Father     Obesity Father     Vascular Disease Father      I have reviewed and agree with the history as documented      E-Cigarette/Vaping     E-Cigarette/Vaping Substances     Social History     Tobacco Use    Smoking status: Never Smoker    Smokeless tobacco: Never Used   Substance Use Topics    Alcohol use: No    Drug use: No       Review of Systems Constitutional: Negative for chills, fever and unexpected weight change  HENT: Negative for congestion, sore throat and trouble swallowing  Eyes: Negative for pain, discharge and itching  Respiratory: Negative for cough, chest tightness, shortness of breath and wheezing  Cardiovascular: Negative for chest pain, palpitations and leg swelling  Gastrointestinal: Negative for abdominal pain, blood in stool, diarrhea, nausea and vomiting  Endocrine: Negative for polyuria  Genitourinary: Negative for difficulty urinating, dysuria, frequency and hematuria  Musculoskeletal: Positive for arthralgias (R ankle) and joint swelling (R ankle)  Negative for back pain  Neurological: Negative for dizziness, syncope, weakness, light-headedness and headaches  Physical Exam  Physical Exam  Vitals and nursing note reviewed  Constitutional:       General: He is not in acute distress  Appearance: He is well-developed  HENT:      Head: Normocephalic and atraumatic  Right Ear: External ear normal       Left Ear: External ear normal    Eyes:      Conjunctiva/sclera: Conjunctivae normal       Pupils: Pupils are equal, round, and reactive to light  Cardiovascular:      Rate and Rhythm: Normal rate and regular rhythm  Heart sounds: Normal heart sounds  No murmur heard  No friction rub  No gallop  Pulmonary:      Effort: Pulmonary effort is normal  No respiratory distress  Breath sounds: Normal breath sounds  No wheezing or rales  Abdominal:      General: Bowel sounds are normal  There is no distension  Palpations: Abdomen is soft  Tenderness: There is no abdominal tenderness  There is no guarding  Musculoskeletal:         General: No tenderness or deformity  Normal range of motion  Cervical back: Normal range of motion  Legs:    Lymphadenopathy:      Cervical: No cervical adenopathy  Skin:     General: Skin is warm and dry     Neurological:      General: No focal deficit present  Mental Status: He is alert and oriented to person, place, and time  Mental status is at baseline  Cranial Nerves: No cranial nerve deficit  Sensory: No sensory deficit  Motor: No weakness or abnormal muscle tone  Psychiatric:         Behavior: Behavior normal          Vital Signs  ED Triage Vitals [07/29/21 1935]   Temperature Pulse Respirations Blood Pressure SpO2   97 9 °F (36 6 °C) 97 18 136/75 98 %      Temp Source Heart Rate Source Patient Position - Orthostatic VS BP Location FiO2 (%)   Temporal Monitor Lying Left arm --      Pain Score       7           Vitals:    07/29/21 1935   BP: 136/75   Pulse: 97   Patient Position - Orthostatic VS: Lying         Visual Acuity      ED Medications  Medications - No data to display    Diagnostic Studies  Results Reviewed     None                 No orders to display              Procedures  Procedures         ED Course                                           MDM  Number of Diagnoses or Management Options  Chronic pain of right ankle  Sprain of right ankle, unspecified ligament, initial encounter  Diagnosis management comments: 26-year-old male presenting for acute on chronic right ankle pain  Sprained a few months ago   Was seen here month ago with negative x-rays  Has not followed up with anyone  Has been taking Tylenol with some relief  Has been able to put weight on his foot  Denies any new injury  Has mild lateral malleolar tenderness similar to last visit  Mild swelling  Given lack of injury and negative x-ray, do not believe repeat imaging is needed  Will place Ace wrap  Given Orthopedic follow-up    Given note for work      Disposition  Final diagnoses:   Chronic pain of right ankle   Sprain of right ankle, unspecified ligament, initial encounter     Time reflects when diagnosis was documented in both MDM as applicable and the Disposition within this note     Time User Action Codes Description Comment 7/29/2021  7:42 PM Anjel Echeverria Add [V12 763,  G89 29] Chronic pain of right ankle     7/29/2021  7:43 PM Anjel Echeverria Add [S93 401A] Sprain of right ankle, unspecified ligament, initial encounter       ED Disposition     ED Disposition Condition Date/Time Comment    Discharge Stable Thu Jul 29, 2021  7:42 PM Rosibel Comp discharge to home/self care  Follow-up Information     Follow up With Specialties Details Why Contact Info Additional [address] [facility] [place] [facility] Schedule an appointment as soon as possible for a visit  For follow up of R ankle pain [address] [facility] [phone]  [address] [place] [address] Specialists [address], [address]          Patient's Medications   Discharge Prescriptions    No medications on file     No discharge procedures on file      PDMP Review     None          ED Provider  Electronically Signed by           Eli Cooley DO  07/29/21 1950

## 2021-12-19 ENCOUNTER — HOSPITAL ENCOUNTER (EMERGENCY)
Facility: HOSPITAL | Age: 26
Discharge: HOME | End: 2021-12-20
Attending: EMERGENCY MEDICINE
Payer: COMMERCIAL

## 2021-12-19 DIAGNOSIS — U07.1 COVID-19: Primary | ICD-10-CM

## 2021-12-19 DIAGNOSIS — R06.00 DYSPNEA: ICD-10-CM

## 2021-12-19 DIAGNOSIS — N17.9 AKI (ACUTE KIDNEY INJURY) (HCC): ICD-10-CM

## 2021-12-19 PROCEDURE — 99285 EMERGENCY DEPT VISIT HI MDM: CPT

## 2021-12-20 ENCOUNTER — APPOINTMENT (EMERGENCY)
Dept: RADIOLOGY | Facility: HOSPITAL | Age: 26
End: 2021-12-20
Payer: COMMERCIAL

## 2021-12-20 VITALS
TEMPERATURE: 98 F | OXYGEN SATURATION: 98 % | HEART RATE: 100 BPM | DIASTOLIC BLOOD PRESSURE: 53 MMHG | RESPIRATION RATE: 22 BRPM | SYSTOLIC BLOOD PRESSURE: 100 MMHG

## 2021-12-20 LAB
ANION GAP SERPL CALCULATED.3IONS-SCNC: 10 MMOL/L (ref 4–13)
BASOPHILS # BLD AUTO: 0.02 THOUSANDS/ΜL (ref 0–0.1)
BASOPHILS NFR BLD AUTO: 0 % (ref 0–1)
BUN SERPL-MCNC: 20 MG/DL (ref 5–25)
CALCIUM SERPL-MCNC: 8.8 MG/DL (ref 8.3–10.1)
CHLORIDE SERPL-SCNC: 102 MMOL/L (ref 100–108)
CO2 SERPL-SCNC: 24 MMOL/L (ref 21–32)
CREAT SERPL-MCNC: 2.54 MG/DL (ref 0.6–1.3)
EOSINOPHIL # BLD AUTO: 0.02 THOUSAND/ΜL (ref 0–0.61)
EOSINOPHIL NFR BLD AUTO: 0 % (ref 0–6)
ERYTHROCYTE [DISTWIDTH] IN BLOOD BY AUTOMATED COUNT: 13.7 % (ref 11.6–15.1)
FLUAV RNA RESP QL NAA+PROBE: NEGATIVE
FLUBV RNA RESP QL NAA+PROBE: NEGATIVE
GFR SERPL CREATININE-BSD FRML MDRD: 33 ML/MIN/1.73SQ M
GLUCOSE SERPL-MCNC: 122 MG/DL (ref 65–140)
HCT VFR BLD AUTO: 46.1 % (ref 36.5–49.3)
HGB BLD-MCNC: 14.9 G/DL (ref 12–17)
IMM GRANULOCYTES # BLD AUTO: 0.01 THOUSAND/UL (ref 0–0.2)
IMM GRANULOCYTES NFR BLD AUTO: 0 % (ref 0–2)
LYMPHOCYTES # BLD AUTO: 0.98 THOUSANDS/ΜL (ref 0.6–4.47)
LYMPHOCYTES NFR BLD AUTO: 16 % (ref 14–44)
MCH RBC QN AUTO: 26.6 PG (ref 26.8–34.3)
MCHC RBC AUTO-ENTMCNC: 32.3 G/DL (ref 31.4–37.4)
MCV RBC AUTO: 82 FL (ref 82–98)
MONOCYTES # BLD AUTO: 0.31 THOUSAND/ΜL (ref 0.17–1.22)
MONOCYTES NFR BLD AUTO: 5 % (ref 4–12)
NEUTROPHILS # BLD AUTO: 4.83 THOUSANDS/ΜL (ref 1.85–7.62)
NEUTS SEG NFR BLD AUTO: 79 % (ref 43–75)
NRBC BLD AUTO-RTO: 0 /100 WBCS
PLATELET # BLD AUTO: 302 THOUSANDS/UL (ref 149–390)
PMV BLD AUTO: 9.9 FL (ref 8.9–12.7)
POTASSIUM SERPL-SCNC: 4.2 MMOL/L (ref 3.5–5.3)
RBC # BLD AUTO: 5.61 MILLION/UL (ref 3.88–5.62)
RSV RNA RESP QL NAA+PROBE: NEGATIVE
SARS-COV-2 RNA RESP QL NAA+PROBE: POSITIVE
SODIUM SERPL-SCNC: 136 MMOL/L (ref 136–145)
WBC # BLD AUTO: 6.17 THOUSAND/UL (ref 4.31–10.16)

## 2021-12-20 PROCEDURE — 96361 HYDRATE IV INFUSION ADD-ON: CPT

## 2021-12-20 PROCEDURE — 85025 COMPLETE CBC W/AUTO DIFF WBC: CPT | Performed by: EMERGENCY MEDICINE

## 2021-12-20 PROCEDURE — 36415 COLL VENOUS BLD VENIPUNCTURE: CPT | Performed by: EMERGENCY MEDICINE

## 2021-12-20 PROCEDURE — 99285 EMERGENCY DEPT VISIT HI MDM: CPT | Performed by: EMERGENCY MEDICINE

## 2021-12-20 PROCEDURE — 0241U HB NFCT DS VIR RESP RNA 4 TRGT: CPT | Performed by: EMERGENCY MEDICINE

## 2021-12-20 PROCEDURE — 93005 ELECTROCARDIOGRAM TRACING: CPT

## 2021-12-20 PROCEDURE — 71045 X-RAY EXAM CHEST 1 VIEW: CPT

## 2021-12-20 PROCEDURE — 96365 THER/PROPH/DIAG IV INF INIT: CPT

## 2021-12-20 PROCEDURE — 80048 BASIC METABOLIC PNL TOTAL CA: CPT | Performed by: EMERGENCY MEDICINE

## 2021-12-20 RX ORDER — SODIUM CHLORIDE, SODIUM GLUCONATE, SODIUM ACETATE, POTASSIUM CHLORIDE, MAGNESIUM CHLORIDE, SODIUM PHOSPHATE, DIBASIC, AND POTASSIUM PHOSPHATE .53; .5; .37; .037; .03; .012; .00082 G/100ML; G/100ML; G/100ML; G/100ML; G/100ML; G/100ML; G/100ML
1000 INJECTION, SOLUTION INTRAVENOUS ONCE
Status: COMPLETED | OUTPATIENT
Start: 2021-12-20 | End: 2021-12-20

## 2021-12-20 RX ORDER — ACETAMINOPHEN 325 MG/1
650 TABLET ORAL ONCE
Status: COMPLETED | OUTPATIENT
Start: 2021-12-20 | End: 2021-12-20

## 2021-12-20 RX ADMIN — SODIUM CHLORIDE, SODIUM GLUCONATE, SODIUM ACETATE, POTASSIUM CHLORIDE, MAGNESIUM CHLORIDE, SODIUM PHOSPHATE, DIBASIC, AND POTASSIUM PHOSPHATE 1000 ML: .53; .5; .37; .037; .03; .012; .00082 INJECTION, SOLUTION INTRAVENOUS at 01:23

## 2021-12-20 RX ADMIN — ACETAMINOPHEN 650 MG: 325 TABLET, FILM COATED ORAL at 00:29

## 2021-12-20 RX ADMIN — SODIUM CHLORIDE 1000 ML: 0.9 INJECTION, SOLUTION INTRAVENOUS at 00:30

## 2021-12-21 LAB
ATRIAL RATE: 111 BPM
P AXIS: 53 DEGREES
PR INTERVAL: 126 MS
QRS AXIS: 44 DEGREES
QRSD INTERVAL: 88 MS
QT INTERVAL: 310 MS
QTC INTERVAL: 421 MS
T WAVE AXIS: 31 DEGREES
VENTRICULAR RATE: 111 BPM

## 2021-12-21 PROCEDURE — 93010 ELECTROCARDIOGRAM REPORT: CPT | Performed by: INTERNAL MEDICINE

## 2022-02-17 ENCOUNTER — APPOINTMENT (EMERGENCY)
Dept: RADIOLOGY | Facility: HOSPITAL | Age: 27
End: 2022-02-17
Payer: COMMERCIAL

## 2022-02-17 ENCOUNTER — HOSPITAL ENCOUNTER (EMERGENCY)
Facility: HOSPITAL | Age: 27
Discharge: HOME | End: 2022-02-17
Attending: EMERGENCY MEDICINE | Admitting: EMERGENCY MEDICINE
Payer: COMMERCIAL

## 2022-02-17 VITALS
SYSTOLIC BLOOD PRESSURE: 127 MMHG | OXYGEN SATURATION: 100 % | RESPIRATION RATE: 20 BRPM | TEMPERATURE: 97.6 F | HEART RATE: 113 BPM | DIASTOLIC BLOOD PRESSURE: 92 MMHG

## 2022-02-17 DIAGNOSIS — M25.572 CHRONIC PAIN OF LEFT ANKLE: Primary | ICD-10-CM

## 2022-02-17 DIAGNOSIS — G89.29 CHRONIC PAIN OF LEFT ANKLE: Primary | ICD-10-CM

## 2022-02-17 PROCEDURE — 99283 EMERGENCY DEPT VISIT LOW MDM: CPT

## 2022-02-17 PROCEDURE — 73610 X-RAY EXAM OF ANKLE: CPT

## 2022-02-17 PROCEDURE — 99284 EMERGENCY DEPT VISIT MOD MDM: CPT | Performed by: EMERGENCY MEDICINE

## 2022-02-17 NOTE — ED PROVIDER NOTES
History  Chief Complaint   Patient presents with    Ankle Pain     patient c/o ongoing L ankle pain for the "past several years"  denies initial injury  believes this is something serious  This is a 53-year-old male with a history of chronic kidney disease secondary to focal segmental glomerulosclerosis who presents with left ankle pain  Patient states that he has been struggling with bilateral ankle pain off and on for the past several years  It has been recommended that he see a podiatrist, however, he has not followed up  Patient has been experiencing left ankle pain over the past several days  Denies any inciting event or injury  He has been taking Tylenol without relief  He has also been wearing an ankle brace with mild relief  Pain is located over the lateral malleolus  He called a podiatrist at 3:00 a m  This morning to try to get an appointment, however, he has not heard back  Prior to Admission Medications   Prescriptions Last Dose Informant Patient Reported? Taking?   ergocalciferol (ERGOCALCIFEROL) 1 25 MG (18233 UT) capsule   No No   Sig: Take 1 capsule (50,000 Units total) by mouth once a week   losartan (COZAAR) 25 mg tablet   No No   Sig: Take 1 tablet (25 mg total) by mouth daily   Patient not taking: Reported on 7/29/2021      Facility-Administered Medications: None       Past Medical History:   Diagnosis Date    Depression        Past Surgical History:   Procedure Laterality Date    IR BIOPSY KIDNEY COLUMBIA KIT NO LATERALITY  5/25/2021       Family History   Problem Relation Age of Onset    Cancer Mother     Diabetes Mother     Hypertension Mother     Obesity Mother     Hypothyroidism Mother     Mental illness Mother     Diabetes Father     Obesity Father     Vascular Disease Father      I have reviewed and agree with the history as documented      E-Cigarette/Vaping     E-Cigarette/Vaping Substances     Social History     Tobacco Use    Smoking status: Never Smoker    Smokeless tobacco: Never Used   Substance Use Topics    Alcohol use: No    Drug use: No       Review of Systems   Constitutional: Negative for chills and fever  HENT: Negative for congestion, rhinorrhea, sore throat and trouble swallowing  Respiratory: Negative for cough, chest tightness, shortness of breath and wheezing  Cardiovascular: Negative for chest pain and palpitations  Gastrointestinal: Negative for abdominal pain, blood in stool, diarrhea, nausea and vomiting  Musculoskeletal: Positive for arthralgias  Negative for back pain and neck pain  All other systems reviewed and are negative  Physical Exam  Physical Exam  Constitutional:       Appearance: Normal appearance  He is well-developed  He is not ill-appearing or toxic-appearing  HENT:      Head: Normocephalic  Mouth/Throat:      Pharynx: Uvula midline  Tonsils: No tonsillar exudate  Eyes:      General: Lids are normal       Conjunctiva/sclera: Conjunctivae normal       Pupils: Pupils are equal, round, and reactive to light  Cardiovascular:      Rate and Rhythm: Regular rhythm  Tachycardia present  Pulmonary:      Effort: Pulmonary effort is normal  No tachypnea  Abdominal:      General: There is no distension  Palpations: Abdomen is soft  Abdomen is not rigid  Musculoskeletal:      Comments: No obvious deformity, effusion, evidence of trauma to the left ankle  Tenderness over the lateral malleolus  Negative anterior/posterior drawer  Patient has full range of motion of the left ankle  DP and PT pulses are 2+  Left foot is pink, warm, perfusing well  Neurological:      Mental Status: He is alert  Psychiatric:         Speech: Speech normal          Behavior: Behavior normal  Behavior is cooperative           Vital Signs  ED Triage Vitals [02/17/22 0410]   Temperature Pulse Respirations Blood Pressure SpO2   97 6 °F (36 4 °C) (!) 113 20 127/92 100 %      Temp Source Heart Rate Source Patient Position - Orthostatic VS BP Location FiO2 (%)   Tympanic Monitor Sitting Left arm --      Pain Score       9           Vitals:    02/17/22 0410   BP: 127/92   Pulse: (!) 113   Patient Position - Orthostatic VS: Sitting         Visual Acuity      ED Medications  Medications - No data to display    Diagnostic Studies  Results Reviewed     None                 XR ankle 3+ views LEFT   ED Interpretation by Cruz Leigh MD (02/17 0436)   No acute osseous abnormality as interpreted by myself  Possible osteoid osteoma of the tibia  Procedures  Procedures         ED Course                                             MDM  Number of Diagnoses or Management Options  Diagnosis management comments: Chronic ankle pain  Will check an x-ray of the left ankle  Follow up with Podiatry  Disposition  Final diagnoses:   Chronic pain of left ankle     Time reflects when diagnosis was documented in both MDM as applicable and the Disposition within this note     Time User Action Codes Description Comment    2/17/2022  4:18 AM Carlos Shepard Add [L25 [id number],  G89 29] Chronic pain of left ankle       ED Disposition     ED Disposition Condition Date/Time Comment    Discharge Stable u Feb 17, 2022  4:18 AM Kahlil Lovelace discharge to home/self care  Follow-up Information     Follow up With Specialties Details Why Contact Info Additional Information    Chato Villalobos DPM Podiatry, Wound Care Schedule an appointment as soon as possible for a visit   [address] [facility] Emergency Department Emergency Medicine Go to  If symptoms worsen [address] [phone]  [address] [place] [address] Emergency Department, [address], [address]          Patient's Medications   Discharge Prescriptions    No medications on file       No discharge procedures on file      PDMP Review     None ED Provider  Electronically Signed by           Cruz Leigh MD  02/17/22 6308

## 2022-07-11 ENCOUNTER — TELEPHONE (OUTPATIENT)
Dept: NEPHROLOGY | Facility: CLINIC | Age: 27
End: 2022-07-11

## 2022-07-12 ENCOUNTER — APPOINTMENT (OUTPATIENT)
Dept: LAB | Facility: HOSPITAL | Age: 27
End: 2022-07-12
Attending: INTERNAL MEDICINE
Payer: COMMERCIAL

## 2022-07-12 ENCOUNTER — TELEPHONE (OUTPATIENT)
Dept: NEPHROLOGY | Facility: CLINIC | Age: 27
End: 2022-07-12

## 2022-07-12 DIAGNOSIS — N18.31 STAGE 3A CHRONIC KIDNEY DISEASE (HCC): Primary | ICD-10-CM

## 2022-07-12 DIAGNOSIS — N18.31 STAGE 3A CHRONIC KIDNEY DISEASE (HCC): ICD-10-CM

## 2022-07-12 LAB
ALBUMIN SERPL BCP-MCNC: 3.7 G/DL (ref 3.5–5)
ALP SERPL-CCNC: 80 U/L (ref 46–116)
ALT SERPL W P-5'-P-CCNC: 42 U/L (ref 12–78)
AMORPH PHOS CRY URNS QL MICRO: ABNORMAL /HPF
ANION GAP SERPL CALCULATED.3IONS-SCNC: 13 MMOL/L (ref 4–13)
AST SERPL W P-5'-P-CCNC: 23 U/L (ref 5–45)
BACTERIA UR QL AUTO: ABNORMAL /HPF
BILIRUB SERPL-MCNC: 0.45 MG/DL (ref 0.2–1)
BILIRUB UR QL STRIP: NEGATIVE
BUN SERPL-MCNC: 29 MG/DL (ref 5–25)
CALCIUM SERPL-MCNC: 9.2 MG/DL (ref 8.3–10.1)
CHLORIDE SERPL-SCNC: 111 MMOL/L (ref 100–108)
CLARITY UR: ABNORMAL
CO2 SERPL-SCNC: 21 MMOL/L (ref 21–32)
COLOR UR: YELLOW
CREAT SERPL-MCNC: 2.56 MG/DL (ref 0.6–1.3)
GFR SERPL CREATININE-BSD FRML MDRD: 33 ML/MIN/1.73SQ M
GLUCOSE SERPL-MCNC: 89 MG/DL (ref 65–140)
GLUCOSE UR STRIP-MCNC: NEGATIVE MG/DL
HGB UR QL STRIP.AUTO: ABNORMAL
KETONES UR STRIP-MCNC: NEGATIVE MG/DL
LEUKOCYTE ESTERASE UR QL STRIP: ABNORMAL
NITRITE UR QL STRIP: POSITIVE
NON-SQ EPI CELLS URNS QL MICRO: ABNORMAL /HPF
PH UR STRIP.AUTO: 6.5 [PH]
POTASSIUM SERPL-SCNC: 4 MMOL/L (ref 3.5–5.3)
PROT SERPL-MCNC: 7.8 G/DL (ref 6.4–8.2)
PROT UR STRIP-MCNC: ABNORMAL MG/DL
RBC #/AREA URNS AUTO: ABNORMAL /HPF
SODIUM SERPL-SCNC: 145 MMOL/L (ref 136–145)
SP GR UR STRIP.AUTO: 1.02 (ref 1–1.03)
UROBILINOGEN UR QL STRIP.AUTO: 0.2 E.U./DL
WBC #/AREA URNS AUTO: ABNORMAL /HPF
WBC CLUMPS # UR AUTO: PRESENT /UL

## 2022-07-12 PROCEDURE — 82570 ASSAY OF URINE CREATININE: CPT

## 2022-07-12 PROCEDURE — 82043 UR ALBUMIN QUANTITATIVE: CPT

## 2022-07-12 PROCEDURE — 36415 COLL VENOUS BLD VENIPUNCTURE: CPT

## 2022-07-12 PROCEDURE — 81001 URINALYSIS AUTO W/SCOPE: CPT

## 2022-07-12 PROCEDURE — 80053 COMPREHEN METABOLIC PANEL: CPT

## 2022-07-12 PROCEDURE — 84156 ASSAY OF PROTEIN URINE: CPT

## 2022-07-13 ENCOUNTER — OFFICE VISIT (OUTPATIENT)
Dept: NEPHROLOGY | Facility: CLINIC | Age: 27
End: 2022-07-13
Payer: COMMERCIAL

## 2022-07-13 VITALS
WEIGHT: 315 LBS | HEART RATE: 96 BPM | OXYGEN SATURATION: 98 % | DIASTOLIC BLOOD PRESSURE: 84 MMHG | HEIGHT: 77 IN | SYSTOLIC BLOOD PRESSURE: 128 MMHG | BODY MASS INDEX: 37.19 KG/M2

## 2022-07-13 DIAGNOSIS — N05.1 FSGS (FOCAL SEGMENTAL GLOMERULOSCLEROSIS): Primary | ICD-10-CM

## 2022-07-13 DIAGNOSIS — E55.9 VITAMIN D DEFICIENCY: ICD-10-CM

## 2022-07-13 DIAGNOSIS — N18.31 STAGE 3A CHRONIC KIDNEY DISEASE (HCC): ICD-10-CM

## 2022-07-13 LAB
CREAT UR-MCNC: 137 MG/DL
CREAT UR-MCNC: 137 MG/DL
MICROALBUMIN UR-MCNC: 1650 MG/L (ref 0–20)
MICROALBUMIN/CREAT 24H UR: 1204 MG/G CREATININE (ref 0–30)
PROT UR-MCNC: 214 MG/DL
PROT/CREAT UR: 1.56 MG/G{CREAT} (ref 0–0.1)

## 2022-07-13 PROCEDURE — 99214 OFFICE O/P EST MOD 30 MIN: CPT | Performed by: INTERNAL MEDICINE

## 2022-07-13 RX ORDER — LOSARTAN POTASSIUM 25 MG/1
25 TABLET ORAL DAILY
Qty: 90 TABLET | Refills: 1 | Status: SHIPPED | OUTPATIENT
Start: 2022-07-13

## 2022-07-13 RX ORDER — MULTIVIT-MIN/IRON/FOLIC ACID/K 18-600-40
2000 CAPSULE ORAL DAILY
Start: 2022-07-13

## 2022-07-13 NOTE — PROGRESS NOTES
[facility] [place], [place]    Name: Kahlil Lovelace  YOB: 1995      Assessment/Plan:    FSGS (focal segmental glomerulosclerosis)  This is secondary due to hyper filtration/obesity, patient to continue take losartan and try to lose weight  Unfortunately he continues to have a significantly elevated BMI but is actively working to lose weight  Current BMI is 41 8  This is a small improvement compared to previous BMI  Patient's main issue is excess caloric intake  Thankfully, the patient's overall proteinuria is now subnephrotic, continue with angiotensin receptor blocker, additional medications may be of benefit going forward  However in the meantime strongly recommended in stressed continued weight loss  Patient deferred and declined nutritional support as well as referral to weight      Stage 3a chronic kidney disease ([facility] )  Lab Results   Component Value Date    EGFR 33 07/12/2022    EGFR 33 12/20/2021    EGFR 45 04/23/2021    CREATININE 2 56 (H) 07/12/2022    CREATININE 2 54 (H) 12/20/2021    CREATININE 2 00 (H) 04/23/2021     Patient's creatinine stable around 2 5 mg/dL  Continue to avoid potential nephrotoxins including nonsteroidal anti-inflammatory medications  Patient takes Tylenol on as needed basis  Vitamin D deficiency  Continue vitamin-D supplementation  Problem List Items Addressed This Visit        Genitourinary    Stage 3a chronic kidney disease [facility])     Lab Results   Component Value Date    EGFR 33 07/12/2022    EGFR 33 12/20/2021    EGFR 45 04/23/2021    CREATININE 2 56 (H) 07/12/2022    CREATININE 2 54 (H) 12/20/2021    CREATININE 2 00 (H) 04/23/2021     Patient's creatinine stable around 2 5 mg/dL  Continue to avoid potential nephrotoxins including nonsteroidal anti-inflammatory medications  Patient takes Tylenol on as needed basis           Relevant Orders    Microalbumin / creatinine urine ratio    Protein / creatinine ratio, urine    Urinalysis with microscopic    Comprehensive metabolic panel    CBC and differential    Vitamin D 25 hydroxy    FSGS (focal segmental glomerulosclerosis) - Primary     This is secondary due to hyper filtration/obesity, patient to continue take losartan and try to lose weight  Unfortunately he continues to have a significantly elevated BMI but is actively working to lose weight  Current BMI is 41 8  This is a small improvement compared to previous BMI  Patient's main issue is excess caloric intake  Thankfully, the patient's overall proteinuria is now subnephrotic, continue with angiotensin receptor blocker, additional medications may be of benefit going forward  However in the meantime strongly recommended in stressed continued weight loss  Patient deferred and declined nutritional support as well as referral to weight           Relevant Medications    losartan (COZAAR) 25 mg tablet       Other    Vitamin D deficiency     Continue vitamin-D supplementation  Relevant Medications    Cholecalciferol (Vitamin D) 50 MCG (2000 UT) CAPS    Other Relevant Orders    Vitamin D 25 hydroxy          Patient is stable from the renal standpoint, we will see him back in 6 months for regular appointment  Blood work to be done in the very near future and then prior to upcoming appointment 6 months  Subjective:      Patient ID: Kellen Hansen is a 24 y o  male  Patient presents for follow-up  Is been over 1 calendar year since I saw the patient, as reminder he is a young man who was diagnosed with focal segmental glomerulosclerosis with nephrotic range proteinuria due to secondary causes of hyperfiltration obesity syndrome  Patient has been doing well denies specific issues  He has been taking losartan 25 mg once daily        The following portions of the patient's history were reviewed and updated as appropriate: allergies, current medications, past family history, past medical history, past social history, past surgical history and problem list     Review of Systems   All other systems reviewed and are negative          Social History     Socioeconomic History    Marital status: Single     Spouse name: None    Number of children: None    Years of education: None    Highest education level: None   Occupational History    None   Tobacco Use    Smoking status: Never Smoker    Smokeless tobacco: Never Used   Substance and Sexual Activity    Alcohol use: No    Drug use: No    Sexual activity: None   Other Topics Concern    None   Social History Narrative    None     Social Determinants of Health     Financial Resource Strain: Not on file   Food Insecurity: Not on file   Transportation Needs: Not on file   Physical Activity: Not on file   Stress: Not on file   Social Connections: Not on file   Intimate Partner Violence: Not on file   Housing Stability: Not on file     Past Medical History:   Diagnosis Date    Depression      Past Surgical History:   Procedure Laterality Date    IR BIOPSY KIDNEY RANDOM  5/25/2021       Current Outpatient Medications:     Cholecalciferol (Vitamin D) 50 MCG (2000 UT) CAPS, Take 1 capsule (2,000 Units total) by mouth in the morning, Disp: , Rfl:     losartan (COZAAR) 25 mg tablet, Take 1 tablet (25 mg total) by mouth daily, Disp: 90 tablet, Rfl: 1    Lab Results   Component Value Date     (H) 11/08/2014    SODIUM 145 07/12/2022    K 4 0 07/12/2022     (H) 07/12/2022    CO2 21 07/12/2022    ANIONGAP 9 11/08/2014    AGAP 13 07/12/2022    BUN 29 (H) 07/12/2022    CREATININE 2 56 (H) 07/12/2022    GLUC 89 07/12/2022    GLUF 109 (H) 04/23/2021    CALCIUM 9 2 07/12/2022    AST 23 07/12/2022    ALT 42 07/12/2022    ALKPHOS 80 07/12/2022    PROT 6 7 11/08/2014    TP 7 8 07/12/2022    BILITOT 0 2 11/08/2014    TBILI 0 45 07/12/2022    EGFR 33 07/12/2022     Lab Results   Component Value Date    WBC 6 17 12/20/2021    HGB 14 9 12/20/2021    HCT 46 1 12/20/2021    MCV 82 12/20/2021     12/20/2021     Lab Results   Component Value Date    CHOLESTEROL 237 (H) 04/02/2021     Lab Results   Component Value Date    HDL 37 (L) 04/02/2021     Lab Results   Component Value Date    LDLCALC 159 (H) 04/02/2021     Lab Results   Component Value Date    TRIG 207 (H) 04/02/2021     No results found for: [place], [place]  Lab Results   Component Value Date    OYH2DQHGLVFC 14 321 (H) 04/02/2021     Lab Results   Component Value Date     7 (H) 05/04/2021    CALCIUM 9 2 07/12/2022     Lab Results   Component Value Date    SPEP See Comment 05/04/2021    UPEP See Comment 05/04/2021     No results found for: Cookie Harris        Objective:      /84 (BP Location: Left arm, Patient Position: Sitting, Cuff Size: Large)   Pulse 96   Ht 6' 5" (1 956 m)   Wt (!) 160 kg (353 lb)   SpO2 98%   BMI 41 86 kg/m²          Physical Exam  Vitals reviewed  Constitutional:       General: He is not in acute distress  Appearance: He is well-developed  HENT:      Head: Normocephalic and atraumatic  Eyes:      Conjunctiva/sclera: Conjunctivae normal    Cardiovascular:      Rate and Rhythm: Normal rate and regular rhythm  Pulmonary:      Effort: Pulmonary effort is normal       Breath sounds: Normal breath sounds  Abdominal:      Palpations: Abdomen is soft  Musculoskeletal:      Cervical back: Neck supple  Skin:     General: Skin is warm  Findings: No rash  Neurological:      Mental Status: He is alert and oriented to person, place, and time  Cranial Nerves: No cranial nerve deficit     Psychiatric:         Behavior: Behavior normal

## 2022-07-26 NOTE — ASSESSMENT & PLAN NOTE
This is secondary due to hyper filtration/obesity, patient to continue take losartan and try to lose weight  Unfortunately he continues to have a significantly elevated BMI but is actively working to lose weight  Current BMI is 41 8  This is a small improvement compared to previous BMI  Patient's main issue is excess caloric intake  Thankfully, the patient's overall proteinuria is now subnephrotic, continue with angiotensin receptor blocker, additional medications may be of benefit going forward  However in the meantime strongly recommended in stressed continued weight loss    Patient deferred and declined nutritional support as well as referral to weight

## 2022-07-26 NOTE — ASSESSMENT & PLAN NOTE
Lab Results   Component Value Date    EGFR 33 07/12/2022    EGFR 33 12/20/2021    EGFR 45 04/23/2021    CREATININE 2 56 (H) 07/12/2022    CREATININE 2 54 (H) 12/20/2021    CREATININE 2 00 (H) 04/23/2021     Patient's creatinine stable around 2 5 mg/dL  Continue to avoid potential nephrotoxins including nonsteroidal anti-inflammatory medications  Patient takes Tylenol on as needed basis

## 2022-12-29 ENCOUNTER — TELEPHONE (OUTPATIENT)
Dept: NEPHROLOGY | Facility: CLINIC | Age: 27
End: 2022-12-29

## 2022-12-29 NOTE — TELEPHONE ENCOUNTER
Tried reaching patient, no answer  Left message on voicemail for patient to have labs/urine done prior to upcoming appt

## 2023-01-01 DIAGNOSIS — N05.1 FSGS (FOCAL SEGMENTAL GLOMERULOSCLEROSIS): ICD-10-CM

## 2023-01-01 RX ORDER — LOSARTAN POTASSIUM 25 MG/1
TABLET ORAL
Qty: 90 TABLET | Refills: 1 | Status: SHIPPED | OUTPATIENT
Start: 2023-01-01

## 2023-02-21 ENCOUNTER — APPOINTMENT (OUTPATIENT)
Dept: LAB | Facility: HOSPITAL | Age: 28
End: 2023-02-21
Attending: INTERNAL MEDICINE

## 2023-02-21 DIAGNOSIS — N18.31 STAGE 3A CHRONIC KIDNEY DISEASE (HCC): ICD-10-CM

## 2023-02-21 DIAGNOSIS — E55.9 VITAMIN D DEFICIENCY: ICD-10-CM

## 2023-02-21 LAB
25(OH)D3 SERPL-MCNC: 14.2 NG/ML (ref 30–100)
ALBUMIN SERPL BCP-MCNC: 3.7 G/DL (ref 3.5–5)
ALP SERPL-CCNC: 76 U/L (ref 34–104)
ALT SERPL W P-5'-P-CCNC: 35 U/L (ref 7–52)
ANION GAP SERPL CALCULATED.3IONS-SCNC: 6 MMOL/L (ref 4–13)
AST SERPL W P-5'-P-CCNC: 17 U/L (ref 13–39)
BACTERIA UR QL AUTO: ABNORMAL /HPF
BASOPHILS # BLD AUTO: 0.1 THOUSANDS/ÂΜL (ref 0–0.1)
BASOPHILS NFR BLD AUTO: 1 % (ref 0–1)
BILIRUB SERPL-MCNC: 0.33 MG/DL (ref 0.2–1)
BILIRUB UR QL STRIP: NEGATIVE
BUN SERPL-MCNC: 23 MG/DL (ref 5–25)
CALCIUM SERPL-MCNC: 9.4 MG/DL (ref 8.4–10.2)
CHLORIDE SERPL-SCNC: 107 MMOL/L (ref 96–108)
CLARITY UR: ABNORMAL
CO2 SERPL-SCNC: 28 MMOL/L (ref 21–32)
COLOR UR: ABNORMAL
CREAT SERPL-MCNC: 2.06 MG/DL (ref 0.6–1.3)
CREAT UR-MCNC: 81.4 MG/DL
CREAT UR-MCNC: 82.6 MG/DL
EOSINOPHIL # BLD AUTO: 0.34 THOUSAND/ÂΜL (ref 0–0.61)
EOSINOPHIL NFR BLD AUTO: 4 % (ref 0–6)
ERYTHROCYTE [DISTWIDTH] IN BLOOD BY AUTOMATED COUNT: 14.9 % (ref 11.6–15.1)
GFR SERPL CREATININE-BSD FRML MDRD: 43 ML/MIN/1.73SQ M
GLUCOSE P FAST SERPL-MCNC: 89 MG/DL (ref 65–99)
GLUCOSE UR STRIP-MCNC: NEGATIVE MG/DL
HCT VFR BLD AUTO: 44.5 % (ref 36.5–49.3)
HGB BLD-MCNC: 14 G/DL (ref 12–17)
HGB UR QL STRIP.AUTO: ABNORMAL
IMM GRANULOCYTES # BLD AUTO: 0.07 THOUSAND/UL (ref 0–0.2)
IMM GRANULOCYTES NFR BLD AUTO: 1 % (ref 0–2)
KETONES UR STRIP-MCNC: NEGATIVE MG/DL
LEUKOCYTE ESTERASE UR QL STRIP: ABNORMAL
LYMPHOCYTES # BLD AUTO: 2.29 THOUSANDS/ÂΜL (ref 0.6–4.47)
LYMPHOCYTES NFR BLD AUTO: 28 % (ref 14–44)
MCH RBC QN AUTO: 26.2 PG (ref 26.8–34.3)
MCHC RBC AUTO-ENTMCNC: 31.5 G/DL (ref 31.4–37.4)
MCV RBC AUTO: 83 FL (ref 82–98)
MICROALBUMIN UR-MCNC: 859 MG/L (ref 0–20)
MICROALBUMIN/CREAT 24H UR: 1040 MG/G CREATININE (ref 0–30)
MONOCYTES # BLD AUTO: 0.61 THOUSAND/ÂΜL (ref 0.17–1.22)
MONOCYTES NFR BLD AUTO: 7 % (ref 4–12)
NEUTROPHILS # BLD AUTO: 4.91 THOUSANDS/ÂΜL (ref 1.85–7.62)
NEUTS SEG NFR BLD AUTO: 59 % (ref 43–75)
NITRITE UR QL STRIP: POSITIVE
NON-SQ EPI CELLS URNS QL MICRO: ABNORMAL /HPF
NRBC BLD AUTO-RTO: 0 /100 WBCS
PH UR STRIP.AUTO: 7 [PH]
PLATELET # BLD AUTO: 365 THOUSANDS/UL (ref 149–390)
PMV BLD AUTO: 9.6 FL (ref 8.9–12.7)
POTASSIUM SERPL-SCNC: 3.9 MMOL/L (ref 3.5–5.3)
PROT SERPL-MCNC: 6.9 G/DL (ref 6.4–8.4)
PROT UR STRIP-MCNC: ABNORMAL MG/DL
PROT UR-MCNC: 113 MG/DL
PROT/CREAT UR: 1.39 MG/G{CREAT} (ref 0–0.1)
RBC # BLD AUTO: 5.34 MILLION/UL (ref 3.88–5.62)
RBC #/AREA URNS AUTO: ABNORMAL /HPF
SODIUM SERPL-SCNC: 141 MMOL/L (ref 135–147)
SP GR UR STRIP.AUTO: 1.02 (ref 1–1.03)
UROBILINOGEN UR QL STRIP.AUTO: 0.2 E.U./DL
WBC # BLD AUTO: 8.32 THOUSAND/UL (ref 4.31–10.16)
WBC #/AREA URNS AUTO: ABNORMAL /HPF

## 2023-02-24 ENCOUNTER — OFFICE VISIT (OUTPATIENT)
Dept: NEPHROLOGY | Facility: CLINIC | Age: 28
End: 2023-02-24

## 2023-02-24 VITALS
HEIGHT: 77 IN | OXYGEN SATURATION: 98 % | HEART RATE: 72 BPM | DIASTOLIC BLOOD PRESSURE: 96 MMHG | SYSTOLIC BLOOD PRESSURE: 168 MMHG | WEIGHT: 315 LBS | BODY MASS INDEX: 37.19 KG/M2

## 2023-02-24 DIAGNOSIS — N05.1 FSGS (FOCAL SEGMENTAL GLOMERULOSCLEROSIS): Primary | ICD-10-CM

## 2023-02-24 DIAGNOSIS — E55.9 VITAMIN D DEFICIENCY: ICD-10-CM

## 2023-02-24 DIAGNOSIS — N18.31 STAGE 3A CHRONIC KIDNEY DISEASE (HCC): ICD-10-CM

## 2023-02-24 RX ORDER — ERGOCALCIFEROL 1.25 MG/1
50000 CAPSULE ORAL WEEKLY
Qty: 12 CAPSULE | Refills: 1 | Status: SHIPPED | OUTPATIENT
Start: 2023-02-24

## 2023-02-24 NOTE — ASSESSMENT & PLAN NOTE
As mentioned previously, the patient most likely has FSGS due to hyperfiltration obesity syndrome  Continue with losartan at this time  Continue to focus on losing weight  Patient is aware that the cause of his abnormal kidney function and proteinuria is due to obesity      Previously, the patient has declined dietary referrals, and also wishes to continue to defer weight

## 2023-02-24 NOTE — ASSESSMENT & PLAN NOTE
Ergocalciferol sent for the patient to take  Previously he wanted to take over-the-counter vitamin D which he has not been taking a regular basis  He is willing to take once a week supplement

## 2023-02-24 NOTE — ASSESSMENT & PLAN NOTE
Lab Results   Component Value Date    EGFR 43 02/21/2023    EGFR 33 07/12/2022    EGFR 33 12/20/2021    CREATININE 2 06 (H) 02/21/2023    CREATININE 2 56 (H) 07/12/2022    CREATININE 2 54 (H) 12/20/2021     Kidney function stable at this time, given the patient's increased body surface area, his estimated GFR is likely under estimating his true creatinine clearance  Continue to monitor for now

## 2023-06-11 ENCOUNTER — HOSPITAL ENCOUNTER (EMERGENCY)
Facility: HOSPITAL | Age: 28
Discharge: HOME | End: 2023-06-11
Attending: EMERGENCY MEDICINE
Payer: COMMERCIAL

## 2023-06-11 VITALS
DIASTOLIC BLOOD PRESSURE: 100 MMHG | OXYGEN SATURATION: 97 % | RESPIRATION RATE: 18 BRPM | TEMPERATURE: 98 F | HEART RATE: 100 BPM | SYSTOLIC BLOOD PRESSURE: 180 MMHG

## 2023-06-11 DIAGNOSIS — M25.572 LEFT ANKLE PAIN: Primary | ICD-10-CM

## 2023-06-11 PROCEDURE — 99283 EMERGENCY DEPT VISIT LOW MDM: CPT

## 2023-06-11 RX ORDER — METHOCARBAMOL 500 MG/1
500 TABLET, FILM COATED ORAL 2 TIMES DAILY
Qty: 20 TABLET | Refills: 0 | Status: SHIPPED | OUTPATIENT
Start: 2023-06-11

## 2023-06-11 RX ORDER — METHOCARBAMOL 500 MG/1
500 TABLET, FILM COATED ORAL ONCE
Status: COMPLETED | OUTPATIENT
Start: 2023-06-11 | End: 2023-06-11

## 2023-06-11 RX ADMIN — METHOCARBAMOL 500 MG: 500 TABLET ORAL at 03:05

## 2023-06-11 NOTE — ED PROVIDER NOTES
History  Chief Complaint   Patient presents with   • Ankle Pain     Patient states left ankle pain since Friday, states has tendonitis      34 M with a PMHx of chronic tendonitis to the left ankle, who complains of flare-up  Patient denies any swelling, no redness, no trauma, no specific inciting event  States it gradually got worse since Friday  He states he has tried everything, including tylenol, motrin, heat, wrapping it with an ace bandage, an old splint he had at home  He reports nothing is helping  He states he does not want an XR at this time, since he knows it will be negative, which I agree with  No fevers/chills  ROS otherwise negative  Prior to Admission Medications   Prescriptions Last Dose Informant Patient Reported? Taking? Acetaminophen (TYLENOL PO) Not Taking  Yes No   Sig: Take by mouth Dose unknown, occasionally for arthritis  About 1-2 times weekly  Patient not taking: Reported on 6/11/2023   ergocalciferol (ERGOCALCIFEROL) 1 25 MG (77039 UT) capsule Not Taking  No No   Sig: Take 1 capsule (50,000 Units total) by mouth once a week   Patient not taking: Reported on 6/11/2023   losartan (COZAAR) 25 mg tablet Not Taking  No No   Sig: take 1 tablet by mouth once daily   Patient not taking: Reported on 6/11/2023      Facility-Administered Medications: None       Past Medical History:   Diagnosis Date   • Depression        Past Surgical History:   Procedure Laterality Date   • IR BIOPSY KIDNEY RANDOM  5/25/2021       Family History   Problem Relation Age of Onset   • Cancer Mother    • Diabetes Mother    • Hypertension Mother    • Obesity Mother    • Hypothyroidism Mother    • Mental illness Mother    • Diabetes Father    • Obesity Father    • Vascular Disease Father      I have reviewed and agree with the history as documented      E-Cigarette/Vaping     E-Cigarette/Vaping Substances     Social History     Tobacco Use   • Smoking status: Never   • Smokeless tobacco: Never   Substance Use Topics   • Alcohol use: No   • Drug use: No       Review of Systems   Constitutional: Negative for chills, diaphoresis, fatigue and fever  HENT: Negative for congestion and sore throat  Eyes: Negative for visual disturbance  Respiratory: Negative for cough, chest tightness and shortness of breath  Cardiovascular: Negative for chest pain, palpitations and leg swelling  Gastrointestinal: Negative for abdominal distention, abdominal pain, constipation, diarrhea, nausea and vomiting  Genitourinary: Negative for difficulty urinating and dysuria  Musculoskeletal: Positive for myalgias  Negative for arthralgias  Skin: Negative for rash  Neurological: Negative for dizziness, weakness, light-headedness, numbness and headaches  Psychiatric/Behavioral: Negative for agitation, behavioral problems and confusion  The patient is not nervous/anxious  All other systems reviewed and are negative  Physical Exam  Physical Exam  Constitutional:       Appearance: He is well-developed  HENT:      Head: Normocephalic and atraumatic  Cardiovascular:      Rate and Rhythm: Normal rate and regular rhythm  Heart sounds: Normal heart sounds  No murmur heard  Pulmonary:      Effort: Pulmonary effort is normal  No respiratory distress  Breath sounds: Normal breath sounds  Abdominal:      General: Bowel sounds are normal  There is no distension  Palpations: Abdomen is soft  Tenderness: There is no abdominal tenderness  Musculoskeletal:         General: Tenderness present  No deformity  Comments: Patient has tenderness over achilles tendon  He is able to plantarflex, dorsiflex the ankle/foot with pain but with normal ROM  He has no bony point tenderness  Skin:     General: Skin is warm  Findings: No rash  Neurological:      Mental Status: He is alert and oriented to person, place, and time  Psychiatric:         Behavior: Behavior normal          Thought Content:  Thought content normal          Judgment: Judgment normal          Vital Signs  ED Triage Vitals [06/11/23 0246]   Temperature Pulse Respirations Blood Pressure SpO2   98 °F (36 7 °C) 100 18 (!) 180/100 97 %      Temp Source Heart Rate Source Patient Position - Orthostatic VS BP Location FiO2 (%)   Tympanic Monitor Standing Right arm --      Pain Score       5           Vitals:    06/11/23 0246   BP: (!) 180/100   Pulse: 100   Patient Position - Orthostatic VS: Standing         Visual Acuity      ED Medications  Medications   methocarbamol (ROBAXIN) tablet 500 mg (500 mg Oral Given 6/11/23 0305)       Diagnostic Studies  Results Reviewed     None                 No orders to display              Procedures  Procedures         ED Course                                             Medical Decision Making  I reviewed the patient's medical chart, PMHx, prior encounters, medications  My DDx includes: Achilles tendonitis, osseous abnormality unlikely given hx and distribution of pain, as well as lack of trauma    I did offer the patient an XR to evaluate bones/joint, however he declined as this will not  and will not show tendon  This seems reasonable, especially without traumatic mechanism and his history  At this time, patient has exhausted multiple modalities, will now try robaxin and have him follow up with sports medicine  Discharged with strict return precautions  Risk  Prescription drug management  Disposition  Final diagnoses:   Left ankle pain     Time reflects when diagnosis was documented in both MDM as applicable and the Disposition within this note     Time User Action Codes Description Comment    6/11/2023  3:00 AM Sony Ardon Add [C56 116] Left ankle pain       ED Disposition     ED Disposition   Discharge    Condition   Stable    Date/Time   Sun Jun 11, 2023  3:00 AM    Comment   Velma Arcos discharge to home/self care                 Follow-up Information     Follow up With Specialties Details Why Contact Info Additional Information    Katie Cortes DO Family Medicine Call  For re-evaluation [address] [facility]  [phone] [address] [facility] Call  For re-evaluation [address] [facility] [id number] [address] [facility] [address] [facility], [place], [place], [address]          Discharge Medication List as of 6/11/2023  3:02 AM      START taking these medications    Details   methocarbamol (ROBAXIN) 500 mg tablet Take 1 tablet (500 mg total) by mouth 2 (two) times a day, Starting Sun 6/11/2023, Normal         CONTINUE these medications which have NOT CHANGED    Details   Acetaminophen (TYLENOL PO) Take by mouth Dose unknown, occasionally for arthritis  About 1-2 times weekly  , Historical Med      ergocalciferol (ERGOCALCIFEROL) 1 25 MG (27557 UT) capsule Take 1 capsule (50,000 Units total) by mouth once a week, Starting Fri 2/24/2023, Normal      losartan (COZAAR) 25 mg tablet take 1 tablet by mouth once daily, Normal             No discharge procedures on file      PDMP Review     None          ED Provider  Electronically Signed by           Jake Foreman MD  06/11/23 0399

## 2023-06-11 NOTE — DISCHARGE INSTRUCTIONS
Please follow all return precautions  Follow up with sports medicine service, information provided  Thank you

## 2023-07-02 ENCOUNTER — HOSPITAL ENCOUNTER (EMERGENCY)
Facility: HOSPITAL | Age: 28
Discharge: LEFT BEFORE DISCHARGE | End: 2023-07-02
Attending: EMERGENCY MEDICINE | Admitting: EMERGENCY MEDICINE
Payer: COMMERCIAL

## 2023-07-02 ENCOUNTER — APPOINTMENT (EMERGENCY)
Dept: RADIOLOGY | Facility: HOSPITAL | Age: 28
End: 2023-07-02
Payer: COMMERCIAL

## 2023-07-02 ENCOUNTER — APPOINTMENT (EMERGENCY)
Dept: CT IMAGING | Facility: HOSPITAL | Age: 28
End: 2023-07-02
Payer: COMMERCIAL

## 2023-07-02 VITALS
RESPIRATION RATE: 20 BRPM | WEIGHT: 315 LBS | HEART RATE: 79 BPM | OXYGEN SATURATION: 95 % | BODY MASS INDEX: 44.44 KG/M2 | TEMPERATURE: 97.5 F | DIASTOLIC BLOOD PRESSURE: 92 MMHG | SYSTOLIC BLOOD PRESSURE: 141 MMHG

## 2023-07-02 DIAGNOSIS — I26.99 PULMONARY EMBOLISM (HCC): Primary | ICD-10-CM

## 2023-07-02 LAB
ALBUMIN SERPL BCP-MCNC: 3.5 G/DL (ref 3.5–5)
ALP SERPL-CCNC: 73 U/L (ref 34–104)
ALT SERPL W P-5'-P-CCNC: 52 U/L (ref 7–52)
ANION GAP SERPL CALCULATED.3IONS-SCNC: 10 MMOL/L
AST SERPL W P-5'-P-CCNC: 36 U/L (ref 13–39)
BASOPHILS # BLD AUTO: 0.08 THOUSANDS/ÂΜL (ref 0–0.1)
BASOPHILS NFR BLD AUTO: 1 % (ref 0–1)
BILIRUB SERPL-MCNC: 0.33 MG/DL (ref 0.2–1)
BUN SERPL-MCNC: 26 MG/DL (ref 5–25)
CALCIUM SERPL-MCNC: 8.9 MG/DL (ref 8.4–10.2)
CARDIAC TROPONIN I PNL SERPL HS: 3 NG/L
CHLORIDE SERPL-SCNC: 104 MMOL/L (ref 96–108)
CO2 SERPL-SCNC: 22 MMOL/L (ref 21–32)
CREAT SERPL-MCNC: 2.36 MG/DL (ref 0.6–1.3)
D DIMER PPP FEU-MCNC: 1.93 UG/ML FEU
EOSINOPHIL # BLD AUTO: 0.21 THOUSAND/ÂΜL (ref 0–0.61)
EOSINOPHIL NFR BLD AUTO: 2 % (ref 0–6)
ERYTHROCYTE [DISTWIDTH] IN BLOOD BY AUTOMATED COUNT: 13.9 % (ref 11.6–15.1)
GFR SERPL CREATININE-BSD FRML MDRD: 36 ML/MIN/1.73SQ M
GLUCOSE SERPL-MCNC: 109 MG/DL (ref 65–140)
HCT VFR BLD AUTO: 40.1 % (ref 36.5–49.3)
HGB BLD-MCNC: 12.9 G/DL (ref 12–17)
IMM GRANULOCYTES # BLD AUTO: 0.05 THOUSAND/UL (ref 0–0.2)
IMM GRANULOCYTES NFR BLD AUTO: 1 % (ref 0–2)
LYMPHOCYTES # BLD AUTO: 1.84 THOUSANDS/ÂΜL (ref 0.6–4.47)
LYMPHOCYTES NFR BLD AUTO: 19 % (ref 14–44)
MCH RBC QN AUTO: 26.9 PG (ref 26.8–34.3)
MCHC RBC AUTO-ENTMCNC: 32.2 G/DL (ref 31.4–37.4)
MCV RBC AUTO: 84 FL (ref 82–98)
MONOCYTES # BLD AUTO: 0.73 THOUSAND/ÂΜL (ref 0.17–1.22)
MONOCYTES NFR BLD AUTO: 8 % (ref 4–12)
NEUTROPHILS # BLD AUTO: 6.88 THOUSANDS/ÂΜL (ref 1.85–7.62)
NEUTS SEG NFR BLD AUTO: 69 % (ref 43–75)
NRBC BLD AUTO-RTO: 0 /100 WBCS
PLATELET # BLD AUTO: 351 THOUSANDS/UL (ref 149–390)
PMV BLD AUTO: 9.7 FL (ref 8.9–12.7)
POTASSIUM SERPL-SCNC: 3.8 MMOL/L (ref 3.5–5.3)
PROT SERPL-MCNC: 6.8 G/DL (ref 6.4–8.4)
RBC # BLD AUTO: 4.79 MILLION/UL (ref 3.88–5.62)
SODIUM SERPL-SCNC: 136 MMOL/L (ref 135–147)
WBC # BLD AUTO: 9.79 THOUSAND/UL (ref 4.31–10.16)

## 2023-07-02 PROCEDURE — 85379 FIBRIN DEGRADATION QUANT: CPT

## 2023-07-02 PROCEDURE — 93005 ELECTROCARDIOGRAM TRACING: CPT

## 2023-07-02 PROCEDURE — 96360 HYDRATION IV INFUSION INIT: CPT

## 2023-07-02 PROCEDURE — G1004 CDSM NDSC: HCPCS

## 2023-07-02 PROCEDURE — 85025 COMPLETE CBC W/AUTO DIFF WBC: CPT

## 2023-07-02 PROCEDURE — 99285 EMERGENCY DEPT VISIT HI MDM: CPT

## 2023-07-02 PROCEDURE — 36415 COLL VENOUS BLD VENIPUNCTURE: CPT

## 2023-07-02 PROCEDURE — 80053 COMPREHEN METABOLIC PANEL: CPT

## 2023-07-02 PROCEDURE — 71045 X-RAY EXAM CHEST 1 VIEW: CPT

## 2023-07-02 PROCEDURE — 84484 ASSAY OF TROPONIN QUANT: CPT

## 2023-07-02 PROCEDURE — 71275 CT ANGIOGRAPHY CHEST: CPT

## 2023-07-02 RX ORDER — ACETAMINOPHEN 325 MG/1
975 TABLET ORAL ONCE
Status: COMPLETED | OUTPATIENT
Start: 2023-07-02 | End: 2023-07-02

## 2023-07-02 RX ADMIN — ACETAMINOPHEN 975 MG: 325 TABLET, FILM COATED ORAL at 07:49

## 2023-07-02 RX ADMIN — SODIUM CHLORIDE 1000 ML: 0.9 INJECTION, SOLUTION INTRAVENOUS at 08:51

## 2023-07-02 RX ADMIN — IOHEXOL 100 ML: 350 INJECTION, SOLUTION INTRAVENOUS at 08:52

## 2023-07-02 NOTE — ED PROVIDER NOTES
History  Chief Complaint   Patient presents with   • Chest Pain     Pt c/o chest pain that started last night. Denies radiating anywhere. 32-year-old male with history of FSGS, CKD stage III, presents today with cute onset sternal chest pain. Patient states it started yesterday evening around 1800 and has been constant and unchanged since. He describes the pain as sharp, pulsatile on the upper right sternal border most significantly. He states that last approximately 1 to 2 seconds and dissipates, returns anywhere from 30 seconds to a minute later. Symptoms are by movement and deep inspiration. Patient has tried ice packs and Robaxin with minimal relief. Patient denies history of blood clots or coagulation disorders, signs symptoms DVT, recent air travel, significant shortness of breath, fevers, chills, nausea/vomiting, abdominal pain, diarrhea/constipation, difficulty breathing, difficulty swallowing, radiation of the pain to arms or back, any significant trauma or other inciting event. Prior to Admission Medications   Prescriptions Last Dose Informant Patient Reported? Taking? Acetaminophen (TYLENOL PO)   Yes No   Sig: Take by mouth Dose unknown, occasionally for arthritis. About 1-2 times weekly.    Patient not taking: Reported on 6/11/2023   ergocalciferol (ERGOCALCIFEROL) 1.25 MG (66125 UT) capsule   No No   Sig: Take 1 capsule (50,000 Units total) by mouth once a week   Patient not taking: Reported on 6/11/2023   losartan (COZAAR) 25 mg tablet   No No   Sig: take 1 tablet by mouth once daily   Patient not taking: Reported on 6/11/2023   methocarbamol (ROBAXIN) 500 mg tablet   No No   Sig: Take 1 tablet (500 mg total) by mouth 2 (two) times a day      Facility-Administered Medications: None       Past Medical History:   Diagnosis Date   • Depression        Past Surgical History:   Procedure Laterality Date   • IR BIOPSY KIDNEY RANDOM  5/25/2021       Family History   Problem Relation Age of Onset   • Cancer Mother    • Diabetes Mother    • Hypertension Mother    • Obesity Mother    • Hypothyroidism Mother    • Mental illness Mother    • Diabetes Father    • Obesity Father    • Vascular Disease Father      I have reviewed and agree with the history as documented. E-Cigarette/Vaping     E-Cigarette/Vaping Substances     Social History     Tobacco Use   • Smoking status: Never   • Smokeless tobacco: Never   Substance Use Topics   • Alcohol use: No   • Drug use: No        Review of Systems   Constitutional: Negative for chills and fever. HENT: Negative for ear pain and sore throat. Eyes: Negative for visual disturbance. Respiratory: Positive for shortness of breath (Unable to take deep breath due to pain). Negative for cough. Cardiovascular: Positive for chest pain. Negative for palpitations. Gastrointestinal: Negative for abdominal pain, nausea and vomiting. Genitourinary: Negative for dysuria and hematuria. Musculoskeletal: Negative for arthralgias and back pain. Skin: Negative for color change and rash. Neurological: Negative for seizures and syncope. All other systems reviewed and are negative. Physical Exam  ED Triage Vitals [07/02/23 0714]   Temperature Pulse Respirations Blood Pressure SpO2   97.5 °F (36.4 °C) 97 20 157/96 96 %      Temp Source Heart Rate Source Patient Position - Orthostatic VS BP Location FiO2 (%)   Temporal Monitor Lying Right arm --      Pain Score       10 - Worst Possible Pain             Orthostatic Vital Signs  Vitals:    07/02/23 0900 07/02/23 0930 07/02/23 1000 07/02/23 1030   BP: 159/80 150/83 147/82 141/92   Pulse: 82 80 79 79   Patient Position - Orthostatic VS:           Physical Exam  Vitals and nursing note reviewed. Constitutional:       General: He is in acute distress. Appearance: He is well-developed. He is not ill-appearing. HENT:      Head: Normocephalic and atraumatic.       Right Ear: External ear normal.      Left Ear: External ear normal.      Nose: Nose normal.      Mouth/Throat:      Mouth: Mucous membranes are moist.   Eyes:      Extraocular Movements: Extraocular movements intact. Cardiovascular:      Rate and Rhythm: Normal rate and regular rhythm. Pulses: Normal pulses. Heart sounds: Normal heart sounds. No murmur heard. Pulmonary:      Effort: Pulmonary effort is normal. No respiratory distress. Breath sounds: Normal breath sounds. Chest:      Chest wall: Tenderness (Significantly tender in right upper sternal border.) present. No mass, deformity, crepitus or edema. Abdominal:      Palpations: Abdomen is soft. Tenderness: There is no abdominal tenderness. Musculoskeletal:         General: Normal range of motion. Cervical back: Neck supple. Right lower leg: No tenderness. No edema. Left lower leg: No tenderness. No edema. Skin:     General: Skin is warm and dry. Neurological:      General: No focal deficit present. Mental Status: He is alert and oriented to person, place, and time.    Psychiatric:         Mood and Affect: Mood normal.         Behavior: Behavior normal.         ED Medications  Medications   acetaminophen (TYLENOL) tablet 975 mg (975 mg Oral Given 7/2/23 0749)   sodium chloride 0.9 % bolus 1,000 mL (0 mL Intravenous Stopped 7/2/23 1021)   iohexol (OMNIPAQUE) 350 MG/ML injection (SINGLE-DOSE) 100 mL (100 mL Intravenous Given 7/2/23 0852)       Diagnostic Studies  Results Reviewed     Procedure Component Value Units Date/Time    HS Troponin 0hr (reflex protocol) [[id number]]  (Normal) Collected: 07/02/23 0750    Lab Status: Final result Specimen: Blood from Arm, Left Updated: 07/02/23 0816     hs TnI 0hr 3 ng/L     D-dimer, quantitative [[id number]]  (Abnormal) Collected: 07/02/23 0750    Lab Status: Final result Specimen: Blood from Arm, Left Updated: 07/02/23 0813     D-Dimer, Quant 1.93 ug/ml [facility]     Comprehensive metabolic panel [[id number]]  (Abnormal) Collected: 07/02/23 0750    Lab Status: Final result Specimen: Blood from Arm, Left Updated: 07/02/23 0811     Sodium 136 mmol/L      Potassium 3.8 mmol/L      Chloride 104 mmol/L      CO2 22 mmol/L      ANION GAP 10 mmol/L      BUN 26 mg/dL      Creatinine 2.36 mg/dL      Glucose 109 mg/dL      Calcium 8.9 mg/dL      AST 36 U/L      ALT 52 U/L      Alkaline Phosphatase 73 U/L      Total Protein 6.8 g/dL      Albumin 3.5 g/dL      Total Bilirubin 0.33 mg/dL      eGFR 36 ml/min/1.73sq m     Narrative:      National Kidney Disease Foundation guidelines for Chronic Kidney Disease (CKD):   •  Stage 1 with normal or high GFR (GFR > 90 mL/min/1.73 square meters)  •  Stage 2 Mild CKD (GFR = 60-89 mL/min/1.73 square meters)  •  Stage 3A Moderate CKD (GFR = 45-59 mL/min/1.73 square meters)  •  Stage 3B Moderate CKD (GFR = 30-44 mL/min/1.73 square meters)  •  Stage 4 Severe CKD (GFR = 15-29 mL/min/1.73 square meters)  •  Stage 5 End Stage CKD (GFR <15 mL/min/1.73 square meters)  Note: GFR calculation is accurate only with a steady state creatinine    CBC and differential [[id number]] Collected: 07/02/23 0750    Lab Status: Final result Specimen: Blood from Arm, Left Updated: 07/02/23 0754     WBC 9.79 Thousand/uL      RBC 4.79 Million/uL      Hemoglobin 12.9 g/dL      Hematocrit 40.1 %      MCV 84 fL      MCH 26.9 pg      MCHC 32.2 g/dL      RDW 13.9 %      MPV 9.7 fL      Platelets 066 Thousands/uL      nRBC 0 /100 WBCs      Neutrophils Relative 69 %      Immat GRANS % 1 %      Lymphocytes Relative 19 %      Monocytes Relative 8 %      Eosinophils Relative 2 %      Basophils Relative 1 %      Neutrophils Absolute 6.88 Thousands/µL      Immature Grans Absolute 0.05 Thousand/uL      Lymphocytes Absolute 1.84 Thousands/µL      Monocytes Absolute 0.73 Thousand/µL      Eosinophils Absolute 0.21 Thousand/µL      Basophils Absolute 0.08 Thousands/µL                  CTA ED chest PE Study   Final Result by Gloria Camacho MD (07/02 1008)      Technically limited examination with findings that are suspicious for possible small segmental pulmonary emboli in the right middle lobe and right upper lobe pulmonary arteries. Because this patient has borderline renal function, repeat IV contrast    administration for an attempt at improving scan quality would be discouraged at this time. Consider admission for anticoagulation and attempt to confirm the presence of pulmonary emboli with a V/Q scan tomorrow. No large central pulmonary embolus. Clear lungs. I personally discussed this study with Kay Campuzano on 7/2/2023 10:07 AM.                  Workstation performed: [id number]         XR chest 1 view portable   ED Interpretation by Hortencia Cárdenas DO (07/02 0827)   No acute cardiopulmonary disease. No PTX, consolidation      Final Result by Elbert Golden MD (07/02 5951)      No acute cardiopulmonary disease. Workstation performed: [id number]               Procedures  ECG 12 Lead Documentation Only    Date/Time: 7/2/2023 7:42 AM    Performed by: Hortencia Cárdenas DO  Authorized by: Hortencia Cárdenas DO    Indications / Diagnosis:  CP  ECG reviewed by me, the ED Provider: yes    Patient location:  ED  Previous ECG:     Previous ECG:  Unavailable    Comparison to cardiac monitor: Yes    Interpretation:     Interpretation: normal    Rate:     ECG rate:  94    ECG rate assessment: normal    Rhythm:     Rhythm: sinus rhythm    Ectopy:     Ectopy: none    QRS:     QRS axis:  Normal    QRS intervals:  Normal  Conduction:     Conduction: normal    ST segments:     ST segments:  Normal  T waves:     T waves: normal            ED Course  ED Course as of 07/02/23 1050   Sun Jul 02, 2023   0821 Comprehensive metabolic panel(!)  CMP showed chronically elevated creatinine and BUN at patient's baseline, otherwise within normal limits.    3036 CBC and differential  wnl   0822 hs TnI 0hr: 3   0827 D-Dimer, Quant(!): 1.93  CT PE study ordered   56 CTA ED chest PE Study  IMPRESSION:     Technically limited examination with findings that are suspicious for possible small segmental pulmonary emboli in the right middle lobe and right upper lobe pulmonary arteries. Because this patient has borderline renal function, repeat IV contrast   administration for an attempt at improving scan quality would be discouraged at this time. Consider admission for anticoagulation and attempt to confirm the presence of pulmonary emboli with a V/Q scan tomorrow.     No large central pulmonary embolus. Clear lungs. 1037 Discussed findings of CT PE study with patient, and informed him that he had multiple segmental pulmonary embolisms. Recommendations were to admit for a V/Q study since the CT was of poor quality and to start anticoagulation. Patient immediately denied and stated he needed to go home as soon as possible. It was discussed at length the risks of leaving. Patient continued to vehemently deny admission or any further testing/medications. Patient was informed he will have to sign out AMA and he agreed. Patient signed AMA form and left. Patient was prescribed starter pack of Eliquis to which she understood he needed to take and follow-up closely with his PCP. HEART Risk Score    Flowsheet Row Most Recent Value   Heart Score Risk Calculator    History 0 Filed at: 07/02/2023 0748   ECG 0 Filed at: 07/02/2023 8809   Age 0 Filed at: 07/02/2023 0748   Risk Factors 1 Filed at: 07/02/2023 0748   Troponin 0 Filed at: 07/02/2023 0748   HEART Score 1 Filed at: 07/02/2023 8583                      SBIRT 20yo+    Flowsheet Row Most Recent Value   Initial Alcohol Screen: US AUDIT-C     1.  How often do you have a drink containing alcohol? 0 Filed at: 07/02/2023 0713   Audit-C Score 0 Filed at: 07/02/2023 4431          Wells' Criteria for PE    Flowsheet Row Most Recent Value   Wells' Criteria for PE    Clinical signs and symptoms of DVT 0 Filed at: 07/02/2023 0097   PE is primary diagnosis or equally likely 0 Filed at: 07/02/2023 0729   HR >100 1.5 Filed at: 07/02/2023 5207   Immobilization at least 3 days or Surgery in the previous 4 weeks 0 Filed at: 07/02/2023 5426   Previous, objectively diagnosed PE or DVT 0 Filed at: 07/02/2023 0729   Hemoptysis 0 Filed at: 07/02/2023 1583   Malignancy with treatment within 6 months or palliative 0 Filed at: 07/02/2023 4700   Wells' Criteria Total 1.5 Filed at: 07/02/2023 8373            Medical Decision Making  DDX including but not limited to: chest wall pain, pleurisy, costochondritis, PTX, pneumonia, GI etiology, PE; doubt pericarditis, ACS, dissection. Oneita Jose F presented due to sudden onset chest pain on the right side. Upon arrival to the ED he was in acute distress and had tenderness to palpation in the area. Chest x-ray showed no signs of pneumothorax, consolidation, or other abnormal findings. D-dimer was positive at 1.3 and CT PE study was ordered. All other labs are within normal limits or at his baseline (decreased kidney function). CT PE study showed multiple middle and upper lobe segmental pulmonary embolism. However, radiologist did note that the quality of the study was poor and recommended admission for V/Q perfusion study in the morning. Upon updating patient he vehemently denied admission or any further stay in the hospital.  It was discussed at length the risks and benefits of leaving and staying, including death, permanent disability, irreversible heart damage, etc.  Patient understood risks, and decided to leave AMA. He was prescribed a starter pack of Eliquis, and patient agreed to take this as prescribed. He was instructed to follow closely with his primary care physician and to monitor symptoms closely. If they are worsening or not resolving then to return to the ED immediately for further evaluation and treatment.     Amount and/or Complexity of Data Reviewed  External Data Reviewed: labs, radiology and ECG. Labs: ordered. Decision-making details documented in ED Course. Radiology: ordered and independent interpretation performed. Decision-making details documented in ED Course. ECG/medicine tests: ordered and independent interpretation performed. Decision-making details documented in ED Course. Risk  OTC drugs. Prescription drug management. Decision regarding hospitalization. Disposition  Final diagnoses:   Pulmonary embolism ([address])     Time reflects when diagnosis was documented in both MDM as applicable and the Disposition within this note     Time User Action Codes Description Comment    7/2/2023 10:19 AM Kelley Sanderson Add [I26.99] Pulmonary embolism [facility])       ED Disposition     ED Disposition   AMA    Condition   --    Date/Time   Sun Jul 2, 2023 10:37 AM    Comment   Date: 7/2/2023  Patient: Alba Lee  Admitted: 7/2/2023  7:11 AM  Attending Provider: Elham Cordova MD    Alba Lee or his authorized caregiver has made the decision for the patient to leave the emergency department against the advice o f his attending physician. He or his authorized caregiver has been informed and understands the inherent risks, including death, stroke, irreversible heart damage, permanent disability. He or his authorized caregiver has decided to accept the respon sibility for this decision. Alba Lee and all necessary parties have been advised that he may return for further evaluation or treatment. His condition at time of discharge was stable.   Alba Lee had current vital signs as follows:  / 83   Pulse 80   Temp 97.5 °F (36.4 °C) (Temporal)   Resp 20   Wt (!) 170 kg (374 lb 12.5 oz)            Follow-up Information     Follow up With Specialties Details Why Contact Info Additional Information    Sandoval Kong DO Family Medicine Call in 2 days For ED follow-up [address] [facility] [address] [facility] Emergency Department Emergency Medicine  If symptoms worsen or do not resolve [address] [facility] [id number] [address] [facility] Emergency Department, [address] [place] [address], [place], [place], [facility]          Discharge Medication List as of 7/2/2023 10:27 AM      START taking these medications    Details   apixaban (ELIQUIS) 5 mg Multiple Dosages:Starting Sun 7/2/2023, Until Sat 7/8/2023 at 2359, THEN Starting Sun 7/9/2023, Until Mon 7/31/2023 at 2359Take 2 tablets (10 mg total) by mouth 2 (two) times a day for 7 days, THEN 1 tablet (5 mg total) 2 (two) times a day for 23 days . , Normal         CONTINUE these medications which have NOT CHANGED    Details   Acetaminophen (TYLENOL PO) Take by mouth Dose unknown, occasionally for arthritis. About 1-2 times weekly. , Historical Med      ergocalciferol (ERGOCALCIFEROL) 1.25 MG (35486 UT) capsule Take 1 capsule (50,000 Units total) by mouth once a week, Starting Fri 2/24/2023, Normal      losartan (COZAAR) 25 mg tablet take 1 tablet by mouth once daily, Normal      methocarbamol (ROBAXIN) 500 mg tablet Take 1 tablet (500 mg total) by mouth 2 (two) times a day, Starting Sun 6/11/2023, Normal           No discharge procedures on file. PDMP Review     None           ED Provider  Attending physically available and evaluated Rissa Griffin. I managed the patient along with the ED Attending.     Electronically Signed by         Dasha Morillo DO  07/02/23 5909

## 2023-07-02 NOTE — ED ATTENDING ATTESTATION
Final Diagnosis:  1. Pulmonary embolism (HCC)           An Jennings MD, saw and evaluated the patient. All available labs and X-rays were ordered by me or the resident and have been reviewed by myself. I discussed the patient with the resident / non-physician and agree with the resident's / non-physician practitioner's findings and plan as documented in the resident's / non-physician practicitioner's note, except where noted. At this point, I agree with the current assessment done in the ED. I was present during key portions of all procedures performed unless otherwise stated. Chief Complaint   Patient presents with   • Chest Pain     Pt c/o chest pain that started last night. Denies radiating anywhere. This is a 34 y.o. male presenting for evaluation of chest pain. Patient states that his chest pain started yesterday evening and identifies this on the right side. This is worse with deep inspiration and with movement. No nausea vomiting, fever or chills. Denies any trauma to the area. No history of cardiac or pulmonary disease. Patient states that he tried to use a ice pack without any improvement. Since he woke up he was still having some discomfort there he came in for further evaluation. 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 Follows with them. Supposed to be on vitamin D as well as other medications for his kidneys. Patient states that he has not taken these for the last 2 weeks. PMH:   has a past medical history of Depression. PSH:   has a past surgical history that includes IR biopsy kidney random (5/25/2021).     Procedures     Social:  Social History     Substance and Sexual Activity   Alcohol Use No     Social History     Tobacco Use   Smoking Status Never   Smokeless Tobacco Never     Social History     Substance and Sexual Activity   Drug Use No     PE:  Vitals:    07/02/23 0900 07/02/23 0930 07/02/23 1000 07/02/23 1030   BP: 159/80 150/83 147/82 141/92   BP Location: Right arm  Right arm Right arm   Pulse: 82 80 79 79   Resp: 18 20 18 20   Temp:       TempSrc:       SpO2: 95% 96% 97% 95%   Weight:           A:    Unless otherwise specified above:     General: VS reviewed  Appears in NAD     Head: Normocephalic, atraumatic     CV: No pallor noted  Lungs:   No respiratory distress     Abdomen:  Soft, non-tender, non-distended     MSK:   No obvious deformity     Skin: No obvious rash. Neuro: Awake, alert, GCS15, CN II-XII grossly intact. Speaking in full sentences. Motor grossly intact. Psychiatric/Behavioral: Appropriate mood and affect   Exam: deferred    P:  -Patient presents for evaluation of chest pain. Will evaluate for ACS, arrhythmia, PE, anemia, infection and electrolyte disturbances.  -Labs revealed an elevated D-dimer, CTA was performed that showed areas concerning for pulmonary emboli although it was a poor scan. Discussed admission, treatment as well as further evaluation. At that time the patient determined that he no longer wants to stay for any additional medical care. Signed out AGAINST MEDICAL ADVICE.  - 13 point ROS was performed and all are normal unless stated in the history above. - Nursing note reviewed. Vitals reviewed. - Orders placed by myself and/or advanced practitioner / resident.    - Previous chart was reviewed  - No language barrier.   - History obtained from patient. - There are no limitations to the history obtained.        Code Status: No Order  Advance Directive and Living Will:      Power of :    POLST:      Medications   acetaminophen (TYLENOL) tablet 975 mg (975 mg Oral Given 7/2/23 5614)   sodium chloride 0.9 % bolus 1,000 mL (0 mL Intravenous Stopped 7/2/23 1021)   iohexol (OMNIPAQUE) 350 MG/ML injection (SINGLE-DOSE) 100 mL (100 mL Intravenous Given 7/2/23 1364)     CTA ED chest PE Study   Final Result      Technically limited examination with findings that are suspicious for possible small segmental pulmonary emboli in the right middle lobe and right upper lobe pulmonary arteries. Because this patient has borderline renal function, repeat IV contrast    administration for an attempt at improving scan quality would be discouraged at this time. Consider admission for anticoagulation and attempt to confirm the presence of pulmonary emboli with a V/Q scan tomorrow. No large central pulmonary embolus. Clear lungs. I personally discussed this study with Berna Ames on 7/2/2023 10:07 AM.                  Workstation performed: [id number]         XR chest 1 view portable   ED Interpretation   No acute cardiopulmonary disease. No PTX, consolidation      Final Result      No acute cardiopulmonary disease. Workstation performed: [id number]           Orders Placed This Encounter   Procedures   • ED ECG Documentation Only   • XR chest 1 view portable   • CTA ED chest PE Study   • CBC and differential   • Comprehensive metabolic panel   • HS Troponin 0hr (reflex protocol)   • D-dimer, quantitative   • Continuous cardiac monitoring   • ECG 12 lead     Labs Reviewed   COMPREHENSIVE METABOLIC PANEL - Abnormal       Result Value Ref Range Status    Sodium 136  135 - 147 mmol/L Final    Potassium 3.8  3.5 - 5.3 mmol/L Final    Chloride 104  96 - 108 mmol/L Final    CO2 22  21 - 32 mmol/L Final    ANION GAP 10  mmol/L Final    BUN 26 (*) 5 - 25 mg/dL Final    Creatinine 2.36 (*) 0.60 - 1.30 mg/dL Final    Comment: Standardized to IDMS reference method    Glucose 109  65 - 140 mg/dL Final    Comment: If the patient is fasting, the ADA then defines impaired fasting glucose as > 100 mg/dL and diabetes as > or equal to 123 mg/dL.     Calcium 8.9  8.4 - 10.2 mg/dL Final    AST 36  13 - 39 U/L Final    ALT 52  7 - 52 U/L Final    Comment: Specimen collection should occur prior to Sulfasalazine administration due to the potential for falsely depressed results. Alkaline Phosphatase 73  34 - 104 U/L Final    Total Protein 6.8  6.4 - 8.4 g/dL Final    Albumin 3.5  3.5 - 5.0 g/dL Final    Total Bilirubin 0.33  0.20 - 1.00 mg/dL Final    Comment: Use of this assay is not recommended for patients undergoing treatment with eltrombopag due to the potential for falsely elevated results. N-acetyl-p-benzoquinone imine (metabolite of Acetaminophen) will generate erroneously low results in samples for patients that have taken an overdose of Acetaminophen. eGFR 36  ml/min/1.73sq m Final    Narrative:     [place] guidelines for Chronic Kidney Disease (CKD):   •  Stage 1 with normal or high GFR (GFR > 90 mL/min/1.73 square meters)  •  Stage 2 Mild CKD (GFR = 60-89 mL/min/1.73 square meters)  •  Stage 3A Moderate CKD (GFR = 45-59 mL/min/1.73 square meters)  •  Stage 3B Moderate CKD (GFR = 30-44 mL/min/1.73 square meters)  •  Stage 4 Severe CKD (GFR = 15-29 mL/min/1.73 square meters)  •  Stage 5 End Stage CKD (GFR <15 mL/min/1.73 square meters)  Note: GFR calculation is accurate only with a steady state creatinine   D-DIMER, QUANTITATIVE - Abnormal    D-Dimer, Quant 1.93 (*) <0.50 ug/ml FEU Final    Comment: Reference and upper limits to exclude DVT and PE are the same. Do not use to exclude if clinical symptoms are present. HS TROPONIN I 0HR - Normal    hs TnI 0hr 3  "Refer to ACS Flowchart"- see link ng/L Final    Comment:                                              Initial (time 0) result  If >=50 ng/L, Myocardial injury suggested ;  Type of myocardial injury and treatment strategy  to be determined. If 5-49 ng/L, a delta result at 2 hours and or 4 hours will be needed to further evaluate. If <4 ng/L, and chest pain has been >3 hours since onset, patient may qualify for discharge based on the HEART score in the ED.   If <5 ng/L and <3hours since onset of chest pain, a delta result at 2 hours will be needed to further evaluate. HS Troponin 99th Percentile URL of a Health Population=12 ng/L with a 95% Confidence Interval of 8-18 ng/L. Second Troponin (time 2 hours)  If calculated delta >= 20 ng/L,  Myocardial injury suggested ; Type of myocardial injury and treatment strategy to be determined. If 5-49 ng/L and the calculated delta is 5-19 ng/L, consult medical service for evaluation. Continue evaluation for ischemia on ecg and other possible etiology and repeat hs troponin at 4 hours. If delta is <5 ng/L at 2 hours, consider discharge based on risk stratification via the HEART score (if in ED), or CHANTELLE risk score in IP/Observation.     HS Troponin 99th Percentile URL of a Health Population=12 ng/L with a 95% Confidence Interval of 8-18 ng/L.   CBC AND DIFFERENTIAL    WBC 9.79  4.31 - 10.16 Thousand/uL Final    RBC 4.79  3.88 - 5.62 Million/uL Final    Hemoglobin 12.9  12.0 - 17.0 g/dL Final    Hematocrit 40.1  36.5 - 49.3 % Final    MCV 84  82 - 98 fL Final    MCH 26.9  26.8 - 34.3 pg Final    MCHC 32.2  31.4 - 37.4 g/dL Final    RDW 13.9  11.6 - 15.1 % Final    MPV 9.7  8.9 - 12.7 fL Final    Platelets 712  697 - 390 Thousands/uL Final    nRBC 0  /100 WBCs Final    Neutrophils Relative 69  43 - 75 % Final    Immat GRANS % 1  0 - 2 % Final    Lymphocytes Relative 19  14 - 44 % Final    Monocytes Relative 8  4 - 12 % Final    Eosinophils Relative 2  0 - 6 % Final    Basophils Relative 1  0 - 1 % Final    Neutrophils Absolute 6.88  1.85 - 7.62 Thousands/µL Final    Immature Grans Absolute 0.05  0.00 - 0.20 Thousand/uL Final    Lymphocytes Absolute 1.84  0.60 - 4.47 Thousands/µL Final    Monocytes Absolute 0.73  0.17 - 1.22 Thousand/µL Final    Eosinophils Absolute 0.21  0.00 - 0.61 Thousand/µL Final    Basophils Absolute 0.08  0.00 - 0.10 Thousands/µL Final     Time reflects when diagnosis was documented in both MDM as applicable and the Disposition within this note     Time User Action Codes Description Comment    7/2/2023 10:19 AM Melvina Antonio Add [I26.99] Pulmonary embolism [facility])       ED Disposition     ED Disposition   AMA    Condition   --    Date/Time   Sun Jul 2, 2023 10:37 AM    Comment   Date: 7/2/2023  Patient: Asad Alvares  Admitted: 7/2/2023  7:11 AM  Attending Provider: Ivet Rangel MD    Asad Alvares or his authorized caregiver has made the decision for the patient to leave the emergency department against the advice o f his attending physician. He or his authorized caregiver has been informed and understands the inherent risks, including death, stroke, irreversible heart damage, permanent disability. He or his authorized caregiver has decided to accept the respon sibility for this decision. Asad Alvares and all necessary parties have been advised that he may return for further evaluation or treatment. His condition at time of discharge was stable. Asad Alvares had current vital signs as follows:  / 83   Pulse 80   Temp 97.5 °F (36.4 °C) (Temporal)   Resp 20   Wt (!) 170 kg (374 lb 12.5 oz)            Follow-up Information     Follow up With Specialties Details Why Contact Info Additional Information    Juan Francisco Bender DO Family Medicine Call in 2 days For ED follow-up [address] [facility] Emergency Department Emergency Medicine  If symptoms worsen or do not resolve [address] [place] [id number]  [address] [place] [address] Emergency Department, [address] [place] [address], [facility]        Discharge Medication List as of 7/2/2023 10:27 AM      START taking these medications    Details   apixaban (ELIQUIS) 5 mg Multiple Dosages:Starting Sun 7/2/2023, Until Sat 7/8/2023 at 2359, THEN Starting Sun 7/9/2023, Until Mon 7/31/2023 at 2359Take 2 tablets (10 mg total) by mouth 2 (two) times a day for 7 days, THEN 1 tablet (5 mg total) 2 (two) times a day for 23 days . , Normal         CONTINUE these medications which have NOT CHANGED    Details   Acetaminophen (TYLENOL PO) Take by mouth Dose unknown, occasionally for arthritis. About 1-2 times weekly. , Historical Med      ergocalciferol (ERGOCALCIFEROL) 1.25 MG (25546 UT) capsule Take 1 capsule (50,000 Units total) by mouth once a week, Starting Fri 2/24/2023, Normal      losartan (COZAAR) 25 mg tablet take 1 tablet by mouth once daily, Normal      methocarbamol (ROBAXIN) 500 mg tablet Take 1 tablet (500 mg total) by mouth 2 (two) times a day, Starting Sun 6/11/2023, Normal           No discharge procedures on file. Prior to Admission Medications   Prescriptions Last Dose Informant Patient Reported? Taking? Acetaminophen (TYLENOL PO)   Yes No   Sig: Take by mouth Dose unknown, occasionally for arthritis. About 1-2 times weekly. Patient not taking: Reported on 6/11/2023   ergocalciferol (ERGOCALCIFEROL) 1.25 MG (72064 UT) capsule   No No   Sig: Take 1 capsule (50,000 Units total) by mouth once a week   Patient not taking: Reported on 6/11/2023   losartan (COZAAR) 25 mg tablet   No No   Sig: take 1 tablet by mouth once daily   Patient not taking: Reported on 6/11/2023   methocarbamol (ROBAXIN) 500 mg tablet   No No   Sig: Take 1 tablet (500 mg total) by mouth 2 (two) times a day      Facility-Administered Medications: None       Portions of the record may have been created with voice recognition software. Occasional wrong word or "sound a like" substitutions may have occurred due to the inherent limitations of voice recognition software. Read the chart carefully and recognize, using context, where substitutions have occurred.     Electronically signed by:  Faith Gagnon

## 2023-07-02 NOTE — Clinical Note
Date: 7/2/2023  Patient: Taniya Chavis  Admitted: 7/2/2023  7:11 AM  Attending Provider: Jaime Stack MD    Taniya Chavis or his authorized caregiver has made the decision for the patient to leave the emergency department against the advice o f his attending physician. He or his authorized caregiver has been informed and understands the inherent risks, including death, stroke, irreversible heart damage, permanent disability. He or his authorized caregiver has decided to accept the respon sibility for this decision. Taniya Chavis and all necessary parties have been advised that he may return for further evaluation or treatment. His condition at time of discharge was stable.   Taniya Chavis had current vital signs as follows:  / 83   Pulse 80   Temp 97.5 °F (36.4 °C) (Temporal)   Resp 20   Wt (!) 170 kg (374 lb 12.5 oz)

## 2023-07-03 LAB
ATRIAL RATE: 94 BPM
P AXIS: 52 DEGREES
PR INTERVAL: 144 MS
QRS AXIS: 40 DEGREES
QRSD INTERVAL: 94 MS
QT INTERVAL: 336 MS
QTC INTERVAL: 420 MS
T WAVE AXIS: 29 DEGREES
VENTRICULAR RATE: 94 BPM

## 2023-07-03 PROCEDURE — 93010 ELECTROCARDIOGRAM REPORT: CPT | Performed by: INTERNAL MEDICINE

## 2023-08-01 DIAGNOSIS — N05.1 FSGS (FOCAL SEGMENTAL GLOMERULOSCLEROSIS): ICD-10-CM

## 2023-08-01 RX ORDER — LOSARTAN POTASSIUM 25 MG/1
25 TABLET ORAL DAILY
Qty: 90 TABLET | Refills: 1 | Status: SHIPPED | OUTPATIENT
Start: 2023-08-01

## 2023-08-09 DIAGNOSIS — E55.9 VITAMIN D DEFICIENCY: ICD-10-CM

## 2023-08-09 RX ORDER — ERGOCALCIFEROL 1.25 MG/1
50000 CAPSULE ORAL WEEKLY
Qty: 12 CAPSULE | Refills: 1 | Status: SHIPPED | OUTPATIENT
Start: 2023-08-09

## 2023-08-11 ENCOUNTER — HOSPITAL ENCOUNTER (EMERGENCY)
Facility: HOSPITAL | Age: 28
Discharge: HOME | End: 2023-08-11
Attending: EMERGENCY MEDICINE
Payer: COMMERCIAL

## 2023-08-11 ENCOUNTER — APPOINTMENT (EMERGENCY)
Dept: CT IMAGING | Facility: HOSPITAL | Age: 28
End: 2023-08-11
Payer: COMMERCIAL

## 2023-08-11 VITALS
RESPIRATION RATE: 20 BRPM | HEART RATE: 91 BPM | DIASTOLIC BLOOD PRESSURE: 107 MMHG | SYSTOLIC BLOOD PRESSURE: 169 MMHG | OXYGEN SATURATION: 98 % | TEMPERATURE: 97.5 F

## 2023-08-11 DIAGNOSIS — R07.9 CHEST PAIN: Primary | ICD-10-CM

## 2023-08-11 LAB
ALBUMIN SERPL BCP-MCNC: 3.8 G/DL (ref 3.5–5)
ALP SERPL-CCNC: 70 U/L (ref 34–104)
ALT SERPL W P-5'-P-CCNC: 24 U/L (ref 7–52)
ANION GAP SERPL CALCULATED.3IONS-SCNC: 7 MMOL/L
AST SERPL W P-5'-P-CCNC: 14 U/L (ref 13–39)
BASOPHILS # BLD AUTO: 0.09 THOUSANDS/ÂΜL (ref 0–0.1)
BASOPHILS NFR BLD AUTO: 1 % (ref 0–1)
BILIRUB SERPL-MCNC: 0.38 MG/DL (ref 0.2–1)
BUN SERPL-MCNC: 31 MG/DL (ref 5–25)
CALCIUM SERPL-MCNC: 9.3 MG/DL (ref 8.4–10.2)
CARDIAC TROPONIN I PNL SERPL HS: 3 NG/L
CHLORIDE SERPL-SCNC: 106 MMOL/L (ref 96–108)
CO2 SERPL-SCNC: 25 MMOL/L (ref 21–32)
CREAT SERPL-MCNC: 2.38 MG/DL (ref 0.6–1.3)
EOSINOPHIL # BLD AUTO: 0.22 THOUSAND/ÂΜL (ref 0–0.61)
EOSINOPHIL NFR BLD AUTO: 2 % (ref 0–6)
ERYTHROCYTE [DISTWIDTH] IN BLOOD BY AUTOMATED COUNT: 14.4 % (ref 11.6–15.1)
GFR SERPL CREATININE-BSD FRML MDRD: 36 ML/MIN/1.73SQ M
GLUCOSE SERPL-MCNC: 124 MG/DL (ref 65–140)
HCT VFR BLD AUTO: 42.9 % (ref 36.5–49.3)
HGB BLD-MCNC: 13.6 G/DL (ref 12–17)
IMM GRANULOCYTES # BLD AUTO: 0.02 THOUSAND/UL (ref 0–0.2)
IMM GRANULOCYTES NFR BLD AUTO: 0 % (ref 0–2)
LYMPHOCYTES # BLD AUTO: 2 THOUSANDS/ÂΜL (ref 0.6–4.47)
LYMPHOCYTES NFR BLD AUTO: 22 % (ref 14–44)
MCH RBC QN AUTO: 26.7 PG (ref 26.8–34.3)
MCHC RBC AUTO-ENTMCNC: 31.7 G/DL (ref 31.4–37.4)
MCV RBC AUTO: 84 FL (ref 82–98)
MONOCYTES # BLD AUTO: 0.57 THOUSAND/ÂΜL (ref 0.17–1.22)
MONOCYTES NFR BLD AUTO: 6 % (ref 4–12)
NEUTROPHILS # BLD AUTO: 6.27 THOUSANDS/ÂΜL (ref 1.85–7.62)
NEUTS SEG NFR BLD AUTO: 69 % (ref 43–75)
NRBC BLD AUTO-RTO: 0 /100 WBCS
PLATELET # BLD AUTO: 357 THOUSANDS/UL (ref 149–390)
PMV BLD AUTO: 9.6 FL (ref 8.9–12.7)
POTASSIUM SERPL-SCNC: 4 MMOL/L (ref 3.5–5.3)
PROT SERPL-MCNC: 7 G/DL (ref 6.4–8.4)
RBC # BLD AUTO: 5.1 MILLION/UL (ref 3.88–5.62)
SODIUM SERPL-SCNC: 138 MMOL/L (ref 135–147)
WBC # BLD AUTO: 9.17 THOUSAND/UL (ref 4.31–10.16)

## 2023-08-11 PROCEDURE — 71275 CT ANGIOGRAPHY CHEST: CPT

## 2023-08-11 PROCEDURE — 99285 EMERGENCY DEPT VISIT HI MDM: CPT

## 2023-08-11 PROCEDURE — 36415 COLL VENOUS BLD VENIPUNCTURE: CPT | Performed by: EMERGENCY MEDICINE

## 2023-08-11 PROCEDURE — 85025 COMPLETE CBC W/AUTO DIFF WBC: CPT | Performed by: EMERGENCY MEDICINE

## 2023-08-11 PROCEDURE — 96360 HYDRATION IV INFUSION INIT: CPT

## 2023-08-11 PROCEDURE — 80053 COMPREHEN METABOLIC PANEL: CPT | Performed by: EMERGENCY MEDICINE

## 2023-08-11 PROCEDURE — 84484 ASSAY OF TROPONIN QUANT: CPT | Performed by: EMERGENCY MEDICINE

## 2023-08-11 PROCEDURE — 99285 EMERGENCY DEPT VISIT HI MDM: CPT | Performed by: PHYSICIAN ASSISTANT

## 2023-08-11 PROCEDURE — G1004 CDSM NDSC: HCPCS

## 2023-08-11 PROCEDURE — 93005 ELECTROCARDIOGRAM TRACING: CPT

## 2023-08-11 RX ADMIN — IOHEXOL 85 ML: 350 INJECTION, SOLUTION INTRAVENOUS at 13:23

## 2023-08-11 RX ADMIN — SODIUM CHLORIDE 1000 ML: 0.9 INJECTION, SOLUTION INTRAVENOUS at 13:26

## 2023-08-11 NOTE — DISCHARGE INSTRUCTIONS
No pulmonary embolism noted on today's CT scan.   Please follow-up with primary care regarding further recommendations on anticoagulation

## 2023-08-11 NOTE — ED PROVIDER NOTES
History  Chief Complaint   Patient presents with   • Chest Pain     Midsternal CP for a few days. Non-radiating, some associated SOB. Dx PE in July. One missed dose of eliquis recently. This is a 22-year-old male who presents to the emergency department today for evaluation of centralized chest pain. He states the pain started about a day and a half ago. It was initially constant however resolved about 12 hours later however last evening the pain came back and was more severe. He describes it as sharp in nature. He states pain is worse with deep breathing. Denies recent leg pain or leg edema. No cough. Upon review of patient's records he was recently in the emergency department about a month ago for chest pain and had elevated dimer had a CTA of the chest which was limited due to artifact however there was thought that the patient had some subsegmental pulmonary emboli they recommended admission for V/Q study refused and ultimately left AMA being placed on a Eliquis starter pack. He does not have a primary care provider. He states that he missed the dose of Eliquis a day and a half ago. He states that he also suffers from kidney disease I did review patient's last nephrology note, kidney disease secondary to obesity he has refused weight loss or dietary changes according to that notation. Prior to Admission Medications   Prescriptions Last Dose Informant Patient Reported? Taking? Acetaminophen (TYLENOL PO)   Yes No   Sig: Take by mouth Dose unknown, occasionally for arthritis. About 1-2 times weekly. Patient not taking: Reported on 6/11/2023   apixaban (ELIQUIS) 5 mg   No No   Sig: Take 2 tablets (10 mg total) by mouth 2 (two) times a day for 7 days, THEN 1 tablet (5 mg total) 2 (two) times a day for 23 days.    ergocalciferol (VITAMIN D2) 50,000 units   No No   Sig: take 1 capsule by mouth every week   losartan (COZAAR) 25 mg tablet   No No   Sig: take 1 tablet by mouth daily methocarbamol (ROBAXIN) 500 mg tablet   No No   Sig: Take 1 tablet (500 mg total) by mouth 2 (two) times a day      Facility-Administered Medications: None       Past Medical History:   Diagnosis Date   • Depression        Past Surgical History:   Procedure Laterality Date   • IR BIOPSY KIDNEY RANDOM  5/25/2021       Family History   Problem Relation Age of Onset   • Cancer Mother    • Diabetes Mother    • Hypertension Mother    • Obesity Mother    • Hypothyroidism Mother    • Mental illness Mother    • Diabetes Father    • Obesity Father    • Vascular Disease Father      I have reviewed and agree with the history as documented. E-Cigarette/Vaping     E-Cigarette/Vaping Substances     Social History     Tobacco Use   • Smoking status: Never   • Smokeless tobacco: Never   Substance Use Topics   • Alcohol use: No   • Drug use: No       Review of Systems   Constitutional: Negative. Negative for activity change, appetite change, chills, diaphoresis, fatigue, fever and unexpected weight change. HENT: Negative. Negative for sore throat, trouble swallowing and voice change. Eyes: Negative. Respiratory: Negative. Negative for cough, chest tightness, shortness of breath and wheezing. Cardiovascular: Positive for chest pain. Negative for palpitations and leg swelling. Gastrointestinal: Negative. Negative for abdominal pain, blood in stool, nausea and vomiting. Endocrine: Negative. Genitourinary: Negative. Negative for flank pain and hematuria. Musculoskeletal: Negative. Negative for arthralgias, back pain, gait problem, joint swelling, myalgias, neck pain and neck stiffness. Skin: Negative. Negative for rash and wound. Allergic/Immunologic: Negative. Neurological: Negative. Negative for dizziness, seizures, syncope, weakness, light-headedness and headaches. Hematological: Negative. Psychiatric/Behavioral: Negative. All other systems reviewed and are negative.       Physical Exam  Physical Exam  Vitals reviewed. Constitutional:       General: He is not in acute distress. Appearance: He is well-developed. He is obese. He is not ill-appearing, toxic-appearing or diaphoretic. HENT:      Right Ear: External ear normal. No swelling. Tympanic membrane is not bulging. Left Ear: External ear normal. No swelling. Tympanic membrane is not bulging. Nose: Nose normal.      Mouth/Throat:      Pharynx: No oropharyngeal exudate. Eyes:      General: Lids are normal.      Conjunctiva/sclera: Conjunctivae normal.      Pupils: Pupils are equal, round, and reactive to light. Neck:      Thyroid: No thyromegaly. Vascular: No JVD. Trachea: No tracheal deviation. Cardiovascular:      Rate and Rhythm: Normal rate and regular rhythm. Pulses: Normal pulses. Heart sounds: Normal heart sounds. No murmur heard. No friction rub. No gallop. Pulmonary:      Effort: Pulmonary effort is normal. No tachypnea, accessory muscle usage or respiratory distress. Breath sounds: Normal breath sounds. No stridor. No decreased breath sounds, wheezing, rhonchi or rales. Chest:      Chest wall: No tenderness. Abdominal:      General: Bowel sounds are normal. There is no distension. Palpations: Abdomen is soft. There is no mass. Tenderness: There is no abdominal tenderness. There is no guarding or rebound. Negative signs include Beal's sign. Hernia: No hernia is present. Musculoskeletal:         General: No tenderness. Normal range of motion. Cervical back: Normal range of motion and neck supple. No edema. Normal range of motion. Right lower leg: No tenderness. No edema. Left lower leg: No tenderness. No edema. Lymphadenopathy:      Cervical: No cervical adenopathy. Skin:     General: Skin is warm and dry. Capillary Refill: Capillary refill takes less than 2 seconds. Coloration: Skin is not pale.       Findings: No erythema or rash.   Neurological:      Mental Status: He is alert and oriented to person, place, and time. GCS: GCS eye subscore is 4. GCS verbal subscore is 5. GCS motor subscore is 6. Cranial Nerves: No cranial nerve deficit. Sensory: No sensory deficit. Deep Tendon Reflexes: Reflexes are normal and symmetric.    Psychiatric:         Speech: Speech normal.         Behavior: Behavior normal.         Vital Signs  ED Triage Vitals   Temperature Pulse Respirations Blood Pressure SpO2   08/11/23 1132 08/11/23 1131 08/11/23 1131 08/11/23 1131 08/11/23 1131   97.5 °F (36.4 °C) 91 20 (!) 169/107 98 %      Temp Source Heart Rate Source Patient Position - Orthostatic VS BP Location FiO2 (%)   08/11/23 1132 08/11/23 1131 08/11/23 1131 08/11/23 1131 --   Temporal Monitor Sitting Right arm       Pain Score       08/11/23 1131       8           Vitals:    08/11/23 1131   BP: (!) 169/107   Pulse: 91   Patient Position - Orthostatic VS: Sitting         Visual Acuity      ED Medications  Medications   sodium chloride 0.9 % bolus 1,000 mL (1,000 mL Intravenous New Bag 8/11/23 1326)   iohexol (OMNIPAQUE) 350 MG/ML injection (SINGLE-DOSE) 85 mL (85 mL Intravenous Given 8/11/23 1323)       Diagnostic Studies  Results Reviewed     Procedure Component Value Units Date/Time    HS Troponin 0hr (reflex protocol) [[id number]]  (Normal) Collected: 08/11/23 1145    Lab Status: Final result Specimen: Blood from Arm, Left Updated: 08/11/23 1213     hs TnI 0hr 3 ng/L     Comprehensive metabolic panel [[id number]]  (Abnormal) Collected: 08/11/23 1145    Lab Status: Final result Specimen: Blood from Arm, Left Updated: 08/11/23 1206     Sodium 138 mmol/L      Potassium 4.0 mmol/L      Chloride 106 mmol/L      CO2 25 mmol/L      ANION GAP 7 mmol/L      BUN 31 mg/dL      Creatinine 2.38 mg/dL      Glucose 124 mg/dL      Calcium 9.3 mg/dL      AST 14 U/L      ALT 24 U/L      Alkaline Phosphatase 70 U/L      Total Protein 7.0 g/dL      Albumin 3.8 g/dL      Total Bilirubin 0.38 mg/dL      eGFR 36 ml/min/1.73sq m     Narrative:      National Kidney Disease Foundation guidelines for Chronic Kidney Disease (CKD):   •  Stage 1 with normal or high GFR (GFR > 90 mL/min/1.73 square meters)  •  Stage 2 Mild CKD (GFR = 60-89 mL/min/1.73 square meters)  •  Stage 3A Moderate CKD (GFR = 45-59 mL/min/1.73 square meters)  •  Stage 3B Moderate CKD (GFR = 30-44 mL/min/1.73 square meters)  •  Stage 4 Severe CKD (GFR = 15-29 mL/min/1.73 square meters)  •  Stage 5 End Stage CKD (GFR <15 mL/min/1.73 square meters)  Note: GFR calculation is accurate only with a steady state creatinine    CBC and differential [[id number]]  (Abnormal) Collected: 08/11/23 1145    Lab Status: Final result Specimen: Blood from Arm, Left Updated: 08/11/23 1150     WBC 9.17 Thousand/uL      RBC 5.10 Million/uL      Hemoglobin 13.6 g/dL      Hematocrit 42.9 %      MCV 84 fL      MCH 26.7 pg      MCHC 31.7 g/dL      RDW 14.4 %      MPV 9.6 fL      Platelets 107 Thousands/uL      nRBC 0 /100 WBCs      Neutrophils Relative 69 %      Immat GRANS % 0 %      Lymphocytes Relative 22 %      Monocytes Relative 6 %      Eosinophils Relative 2 %      Basophils Relative 1 %      Neutrophils Absolute 6.27 Thousands/µL      Immature Grans Absolute 0.02 Thousand/uL      Lymphocytes Absolute 2.00 Thousands/µL      Monocytes Absolute 0.57 Thousand/µL      Eosinophils Absolute 0.22 Thousand/µL      Basophils Absolute 0.09 Thousands/µL                  CTA ED chest PE Study   Final Result by Margareth Little MD (08/11 1332)      No pulmonary embolism. Interval resolution of the previously noted possible filling defects in the right lung.                   Workstation performed: [id number]                    Procedures  ECG 12 Lead Documentation Only    Date/Time: 8/11/2023 1:52 PM    Performed by: Ines Quintanilla PA-C  Authorized by: Ines Quintanilla PA-C    Indications / Diagnosis:  Chest pain  ECG reviewed by me, the ED Provider: yes    Patient location:  ED  Previous ECG:     Comparison to cardiac monitor: Yes    Interpretation:     Interpretation: normal    Rate:     ECG rate:  96    ECG rate assessment: normal    Rhythm:     Rhythm: sinus rhythm    Ectopy:     Ectopy: none    QRS:     QRS axis:  Normal    QRS intervals:  Normal  Conduction:     Conduction: normal    ST segments:     ST segments:  Normal  T waves:     T waves: normal               ED Course  ED Course as of 08/11/23 1357   Fri Aug 11, 2023   1318 Signs reviewed there is some initial mild tachycardia, otherwise within reasonable limits. Patient is at CT scan now   1350 IMPRESSION:     No pulmonary embolism. Interval resolution of the previously noted possible filling defects in the right lung. HEART Risk Score    Flowsheet Row Most Recent Value   Heart Score Risk Calculator    History 0 Filed at: 08/11/2023 1351   ECG 0 Filed at: 08/11/2023 1351   Age 0 Filed at: 08/11/2023 1351   Risk Factors 1 Filed at: 08/11/2023 1351   Troponin 0 Filed at: 08/11/2023 1351   HEART Score 1 Filed at: 08/11/2023 1351                            Wells' Criteria for PE    Flowsheet Row Most Recent Value   Wells' Criteria for PE    Clinical signs and symptoms of DVT 0 Filed at: 08/11/2023 1248   PE is primary diagnosis or equally likely 3 Filed at: 08/11/2023 1248   HR >100 1.5 Filed at: 08/11/2023 1248   Immobilization at least 3 days or Surgery in the previous 4 weeks 0 Filed at: 08/11/2023 1248   Previous, objectively diagnosed PE or DVT 1.5 Filed at: 08/11/2023 1248   Hemoptysis 0 Filed at: 08/11/2023 1248   Malignancy with treatment within 6 months or palliative 0 Filed at: 08/11/2023 1248   Wells' Criteria Total 6 Filed at: 08/11/2023 1248                Medical Decision Making  62-year-old male who is on Eliquis missed a dose of this a day and a half ago.   Here for chest pain, recent ED note reviewed patient had potential pulmonary embolism noted on a suboptimal CT scan refused admission and left AMA at that point. He was recommended to have a VQ scan but refused. There seems to be an element noncompliance he does have obesity and subsequently chronic kidney disease secondary to this according to note from nephrology that I did review he has refused dietary or weight loss instructions. He will certainly be worked up here today for acute coronary syndrome pneumothorax and worsening of pulmonary embolism. His kidney function will be reviewed prior to decision making on imaging. Chest pain has been ongoing for about a day and a half his troponin is    Normal no need for delta troponin EKG normal, CTA does not no pulmonary embolism. It is reasonable to think that his blood clots were either resolved versus they were never there in the first place, recommend follow-up with primary care regarding further anticoagulation recommendations. Amount and/or Complexity of Data Reviewed  Labs: ordered. Radiology: ordered. Risk  Prescription drug management. Disposition  Final diagnoses:   Chest pain     Time reflects when diagnosis was documented in both MDM as applicable and the Disposition within this note     Time User Action Codes Description Comment    8/11/2023  1:51 PM Ottis Gosselin Add [R07.9] Chest pain       ED Disposition     ED Disposition   Discharge    Condition   Stable    Date/Time   Fri Aug 11, 2023  1:51 PM    Comment   Kaya Connolly discharge to home/self care.                Follow-up Information     Follow up With Specialties Details Why Contact Info Additional Information    Verenice Cantu DO Family Medicine Schedule an appointment as soon as possible for a visit   [address] [facility] Emergency Department Emergency Medicine Go to  If symptoms worsen [address] [facility] [id number]  [address] [place] [address] Emergency Department, [facility] [address], [place], [address]          Patient's Medications   Discharge Prescriptions    No medications on file           PDMP Review     None          ED Provider  Electronically Signed by           Karoline Faith PA-C  08/11/23 3726

## 2023-08-15 LAB
ATRIAL RATE: 96 BPM
P AXIS: 45 DEGREES
PR INTERVAL: 148 MS
QRS AXIS: 30 DEGREES
QRSD INTERVAL: 96 MS
QT INTERVAL: 350 MS
QTC INTERVAL: 442 MS
T WAVE AXIS: 27 DEGREES
VENTRICULAR RATE: 96 BPM

## 2023-08-15 PROCEDURE — 93010 ELECTROCARDIOGRAM REPORT: CPT | Performed by: INTERNAL MEDICINE

## 2023-08-18 ENCOUNTER — OFFICE VISIT (OUTPATIENT)
Dept: NEPHROLOGY | Facility: CLINIC | Age: 28
End: 2023-08-18

## 2023-08-18 VITALS
OXYGEN SATURATION: 98 % | DIASTOLIC BLOOD PRESSURE: 80 MMHG | WEIGHT: 315 LBS | HEART RATE: 92 BPM | SYSTOLIC BLOOD PRESSURE: 130 MMHG | BODY MASS INDEX: 37.19 KG/M2 | HEIGHT: 77 IN

## 2023-08-18 DIAGNOSIS — N05.1 FSGS (FOCAL SEGMENTAL GLOMERULOSCLEROSIS): ICD-10-CM

## 2023-08-18 DIAGNOSIS — R73.01 IMPAIRED FASTING GLUCOSE: ICD-10-CM

## 2023-08-18 DIAGNOSIS — N18.31 STAGE 3A CHRONIC KIDNEY DISEASE (HCC): Primary | ICD-10-CM

## 2023-08-18 DIAGNOSIS — E55.9 VITAMIN D DEFICIENCY: ICD-10-CM

## 2023-08-18 NOTE — ASSESSMENT & PLAN NOTE
Lab Results   Component Value Date    EGFR 36 08/11/2023    EGFR 36 07/02/2023    EGFR 43 02/21/2023    CREATININE 2.38 (H) 08/11/2023    CREATININE 2.36 (H) 07/02/2023    CREATININE 2.06 (H) 02/21/2023     Kidney function remains stable, as mentioned previously, given the patient's significantly higher body surface area compared to MDRD equation, estimated GFR is likely under representing true creatinine clearance. Continue to monitor creatinine levels, patient kidney function is stable.

## 2023-08-18 NOTE — PROGRESS NOTES
[facility] of [address] [place] [address]    Name: Aimee Cerda  YOB: 1995      Assessment/Plan:    Stage 3a chronic kidney disease [facility])  Lab Results   Component Value Date    EGFR 36 08/11/2023    EGFR 36 07/02/2023    EGFR 43 02/21/2023    CREATININE 2.38 (H) 08/11/2023    CREATININE 2.36 (H) 07/02/2023    CREATININE 2.06 (H) 02/21/2023     Kidney function remains stable, as mentioned previously, given the patient's significantly higher body surface area compared to MDRD equation, estimated GFR is likely under representing true creatinine clearance. Continue to monitor creatinine levels, patient kidney function is stable. FSGS (focal segmental glomerulosclerosis)  Patient has obesity related hyperfiltration with secondary FSGS. Unfortunately has not had a chance to lose any weight over the last couple years of close follow-up and counseling. He is agreeable to initiate Ozempic given that the patient now has impaired fasting glucose levels. Side effects of this medication were reviewed as well as benefits. He will have his mother assist him with injection given that she has knowledge on how pen needles operate. Impaired fasting glucose  Patient now has had several labs in a row that show elevated blood glucose levels. We will continue to monitor but will also add Ozempic in the meantime. Patient will also try to transition to a lower carbohydrate diet. Vitamin D deficiency  Continue ergocalciferol weekly, follow-up vitamin D level in about 1 year. Problem List Items Addressed This Visit        Endocrine    Impaired fasting glucose     Patient now has had several labs in a row that show elevated blood glucose levels. We will continue to monitor but will also add Ozempic in the meantime. Patient will also try to transition to a lower carbohydrate diet.          Relevant Medications    semaglutide, 0.25 or 0.5 mg/dose, (Ozempic, 0.25 or 0.5 MG/DOSE,) 2 mg/3 mL injection pen       Genitourinary    Stage 3a chronic kidney disease ([address]) - Primary     Lab Results   Component Value Date    EGFR 36 08/11/2023    EGFR 36 07/02/2023    EGFR 43 02/21/2023    CREATININE 2.38 (H) 08/11/2023    CREATININE 2.36 (H) 07/02/2023    CREATININE 2.06 (H) 02/21/2023     Kidney function remains stable, as mentioned previously, given the patient's significantly higher body surface area compared to MDRD equation, estimated GFR is likely under representing true creatinine clearance. Continue to monitor creatinine levels, patient kidney function is stable. Relevant Medications    semaglutide, 0.25 or 0.5 mg/dose, (Ozempic, 0.25 or 0.5 MG/DOSE,) 2 mg/3 mL injection pen    Other Relevant Orders    Comprehensive metabolic panel    CBC and differential    Magnesium    Protein / creatinine ratio, urine    Albumin / creatinine urine ratio    Urinalysis with microscopic    Phosphorus    Vitamin D 25 hydroxy    FSGS (focal segmental glomerulosclerosis)     Patient has obesity related hyperfiltration with secondary FSGS. Unfortunately has not had a chance to lose any weight over the last couple years of close follow-up and counseling. He is agreeable to initiate Ozempic given that the patient now has impaired fasting glucose levels. Side effects of this medication were reviewed as well as benefits. He will have his mother assist him with injection given that she has knowledge on how pen needles operate. Relevant Orders    Comprehensive metabolic panel    CBC and differential    Magnesium    Protein / creatinine ratio, urine    Albumin / creatinine urine ratio    Urinalysis with microscopic    Phosphorus    Vitamin D 25 hydroxy       Other    Vitamin D deficiency     Continue ergocalciferol weekly, follow-up vitamin D level in about 1 year.          BMI 40.0-44.9, adult [facility])       Patient stable from the renal standpoint, however continues to have difficulty losing weight and now has impaired fasting glucose. Initiated Ozempic to assist with both glucose management and weight. He will give us a call back in about 2 months with an update on how he is doing with the medication. Patient is aware of potential side effects. Subjective:      Patient ID: Rissa Griffin is a 34 y.o. male. Patient presents for follow-up appoint. Reviewed the patient's labs in detail, creatinine is about stable at 2.4 mg/dL, there are no significant electrolyte abnormalities at this time. Patient's fasting glucose noted to be 124 mg/dL. He is taking all medications as prescribed and no specific side effects at this time. He is avoiding all nonsteroid anti-inflammatory medications. Unfortunately, the patient has not hadsuccess with weight loss. Although he has changed his diet and reduced the intake of some foods, in general, his weight is essentially unchanged over the last 6 months. The following portions of the patient's history were reviewed and updated as appropriate: allergies, current medications, past family history, past medical history, past social history, past surgical history and problem list.    Review of Systems   All other systems reviewed and are negative.         Social History     Socioeconomic History   • Marital status: Single     Spouse name: None   • Number of children: None   • Years of education: None   • Highest education level: None   Occupational History   • None   Tobacco Use   • Smoking status: Never   • Smokeless tobacco: Never   Substance and Sexual Activity   • Alcohol use: No   • Drug use: No   • Sexual activity: None   Other Topics Concern   • None   Social History Narrative   • None     Social Determinants of Health     Financial Resource Strain: Not on file   Food Insecurity: Not on file   Transportation Needs: Not on file   Physical Activity: Not on file   Stress: Not on file   Social Connections: Not on file   Intimate Partner Violence: Not on file   Housing Stability: Not on file     Past Medical History:   Diagnosis Date   • Depression      Past Surgical History:   Procedure Laterality Date   • IR BIOPSY KIDNEY RANDOM  5/25/2021       Current Outpatient Medications:   •  Acetaminophen (TYLENOL PO), Take by mouth Dose unknown, occasionally for arthritis. About 1-2 times weekly. , Disp: , Rfl:   •  apixaban (Eliquis) 5 mg, Take 1 tablet (5 mg total) by mouth 2 (two) times a day, Disp: 60 tablet, Rfl: 1  •  ergocalciferol (VITAMIN D2) 50,000 units, take 1 capsule by mouth every week, Disp: 12 capsule, Rfl: 1  •  semaglutide, 0.25 or 0.5 mg/dose, (Ozempic, 0.25 or 0.5 MG/DOSE,) 2 mg/3 mL injection pen, Inject 0.75 mL (0.5 mg total) under the skin every 7 days, Disp: 3 mL, Rfl: 3  •  losartan (COZAAR) 25 mg tablet, take 1 tablet by mouth daily, Disp: 90 tablet, Rfl: 1    Lab Results   Component Value Date     (H) 11/08/2014    SODIUM 138 08/11/2023    K 4.0 08/11/2023     08/11/2023    CO2 25 08/11/2023    ANIONGAP 9 11/08/2014    AGAP 7 08/11/2023    BUN 31 (H) 08/11/2023    CREATININE 2.38 (H) 08/11/2023    GLUC 124 08/11/2023    GLUF 89 02/21/2023    CALCIUM 9.3 08/11/2023    AST 14 08/11/2023    ALT 24 08/11/2023    ALKPHOS 70 08/11/2023    PROT 6.7 11/08/2014    TP 7.0 08/11/2023    BILITOT 0.2 11/08/2014    TBILI 0.38 08/11/2023    EGFR 36 08/11/2023     Lab Results   Component Value Date    WBC 9.17 08/11/2023    HGB 13.6 08/11/2023    HCT 42.9 08/11/2023    MCV 84 08/11/2023     08/11/2023     Lab Results   Component Value Date    CHOLESTEROL 237 (H) 04/02/2021     Lab Results   Component Value Date    HDL 37 (L) 04/02/2021     Lab Results   Component Value Date    LDLCALC 159 (H) 04/02/2021     Lab Results   Component Value Date    TRIG 207 (H) 04/02/2021     No results found for: "CHOLHDL"  Lab Results   Component Value Date    YIE3EQXMEZPT 14.321 (H) 04/02/2021     Lab Results   Component Value Date    .7 (H) 05/04/2021 CALCIUM 9.3 08/11/2023     Lab Results   Component Value Date    SPEP See Comment 05/04/2021    UPEP See Comment 05/04/2021     No results found for: "Carlos Giraldo", "[id number]"        Objective:      /80   Pulse 92   Ht 6' 5" (1.956 m)   Wt (!) 169 kg (372 lb)   SpO2 98%   BMI 44.11 kg/m²          Physical Exam  Vitals reviewed. Constitutional:       General: He is not in acute distress. Appearance: He is well-developed. HENT:      Head: Normocephalic and atraumatic. Eyes:      Conjunctiva/sclera: Conjunctivae normal.   Cardiovascular:      Rate and Rhythm: Normal rate and regular rhythm. Pulmonary:      Effort: Pulmonary effort is normal.      Breath sounds: Normal breath sounds. Abdominal:      Palpations: Abdomen is soft. Musculoskeletal:      Cervical back: Neck supple. Right lower leg: Edema present. Left lower leg: Edema present. Skin:     General: Skin is warm. Findings: No rash. Neurological:      Mental Status: He is alert and oriented to person, place, and time. Cranial Nerves: No cranial nerve deficit.    Psychiatric:         Behavior: Behavior normal.

## 2023-08-18 NOTE — ASSESSMENT & PLAN NOTE
Patient has obesity related hyperfiltration with secondary FSGS. Unfortunately has not had a chance to lose any weight over the last couple years of close follow-up and counseling. He is agreeable to initiate Ozempic given that the patient now has impaired fasting glucose levels. Side effects of this medication were reviewed as well as benefits. He will have his mother assist him with injection given that she has knowledge on how pen needles operate.

## 2023-08-18 NOTE — ASSESSMENT & PLAN NOTE
Patient now has had several labs in a row that show elevated blood glucose levels. We will continue to monitor but will also add Ozempic in the meantime. Patient will also try to transition to a lower carbohydrate diet.

## 2024-03-11 ENCOUNTER — OFFICE VISIT (OUTPATIENT)
Dept: FAMILY MEDICINE CLINIC | Facility: CLINIC | Age: 29
End: 2024-03-11
Payer: COMMERCIAL

## 2024-03-11 VITALS
RESPIRATION RATE: 17 BRPM | WEIGHT: 315 LBS | HEART RATE: 99 BPM | TEMPERATURE: 97.7 F | HEIGHT: 78 IN | OXYGEN SATURATION: 97 % | BODY MASS INDEX: 36.45 KG/M2 | DIASTOLIC BLOOD PRESSURE: 98 MMHG | SYSTOLIC BLOOD PRESSURE: 140 MMHG

## 2024-03-11 DIAGNOSIS — E66.01 CLASS 3 SEVERE OBESITY WITH BODY MASS INDEX (BMI) OF 45.0 TO 49.9 IN ADULT, UNSPECIFIED OBESITY TYPE, UNSPECIFIED WHETHER SERIOUS COMORBIDITY PRESENT (HCC): ICD-10-CM

## 2024-03-11 DIAGNOSIS — I10 ESSENTIAL HYPERTENSION: Primary | ICD-10-CM

## 2024-03-11 DIAGNOSIS — N05.1 FSGS (FOCAL SEGMENTAL GLOMERULOSCLEROSIS): ICD-10-CM

## 2024-03-11 DIAGNOSIS — E55.9 VITAMIN D DEFICIENCY: ICD-10-CM

## 2024-03-11 PROBLEM — R07.9 CHEST PAIN: Status: ACTIVE | Noted: 2024-03-11

## 2024-03-11 PROBLEM — R07.9 CHEST PAIN: Status: RESOLVED | Noted: 2024-03-11 | Resolved: 2024-03-11

## 2024-03-11 PROCEDURE — 99203 OFFICE O/P NEW LOW 30 MIN: CPT | Performed by: PHYSICIAN ASSISTANT

## 2024-03-11 RX ORDER — LOSARTAN POTASSIUM 25 MG/1
25 TABLET ORAL DAILY
Qty: 90 TABLET | Refills: 1 | Status: SHIPPED | OUTPATIENT
Start: 2024-03-11

## 2024-03-11 RX ORDER — ERGOCALCIFEROL 1.25 MG/1
50000 CAPSULE ORAL WEEKLY
Qty: 12 CAPSULE | Refills: 1 | Status: SHIPPED | OUTPATIENT
Start: 2024-03-11

## 2024-03-11 NOTE — ASSESSMENT & PLAN NOTE
Restart losartan 25 mg, pt to RTO 2 weeks for follow up. Pt also to call nephrology for follow up appointment.

## 2024-03-11 NOTE — PROGRESS NOTES
Name: Jese Yap      : 1995      MRN: [id number]  Encounter Provider: Caty Win PA-C  Encounter Date: 3/11/2024   Encounter department: [facility]    Assessment & Plan     1. Essential hypertension  Assessment & Plan:  Restart losartan 25 mg, pt to RTO 2 weeks for follow up. Pt also to call nephrology for follow up appointment.    Orders:  -     losartan (COZAAR) 25 mg tablet; Take 1 tablet (25 mg total) by mouth daily    2. FSGS (focal segmental glomerulosclerosis)  Assessment & Plan:  Pt over due to see nephrology, he is to call their office for follow up.      3. Vitamin D deficiency  Assessment & Plan:  Restart Vit D supplementation.    Orders:  -     ergocalciferol (VITAMIN D2) 50,000 units; Take 1 capsule (50,000 Units total) by mouth once a week    4. Class 3 severe obesity with body mass index (BMI) of 45.0 to 49.9 in adult, unspecified obesity type, unspecified whether serious comorbidity present (HCC)  -     Ambulatory Referral to Family Practice        Depression Screening and Follow-up Plan: Patient was screened for depression during today's encounter. They screened negative with a PHQ-2 score of 0.        Subjective      Jese is here today to establish care with our office. Has a history of chronic kidney disease, was lost to follow up with nephrology due to loss of insurance. Pt states he is going to be calling their office back for an appointment now, was due in Feb. He has labs ordered that needs to be drawn.   Pt with HTN today, used to take losartan 25 mg daily but pt stopped taking when lost insurance, same with Vit D supplement. He does not have any new concerns or problems at this time.      Review of Systems   Constitutional:  Negative for chills and fever.   HENT:  Negative for ear pain and sore throat.    Eyes:  Negative for pain and visual disturbance.   Respiratory:  Negative for cough and shortness of breath.    Cardiovascular:  Negative for chest pain and  "palpitations.   Gastrointestinal:  Negative for abdominal pain and vomiting.   Genitourinary:  Negative for dysuria and hematuria.   Musculoskeletal:  Negative for arthralgias and back pain.   Skin:  Negative for color change and rash.   Neurological:  Negative for seizures and syncope.   All other systems reviewed and are negative.      Current Outpatient Medications on File Prior to Visit   Medication Sig   • Acetaminophen (TYLENOL PO) Take by mouth Dose unknown, occasionally for arthritis. About 1-2 times weekly.   • [DISCONTINUED] apixaban (Eliquis) 5 mg Take 1 tablet (5 mg total) by mouth 2 (two) times a day (Patient not taking: Reported on 3/11/2024)   • [DISCONTINUED] ergocalciferol (VITAMIN D2) 50,000 units take 1 capsule by mouth every week (Patient not taking: Reported on 3/11/2024)   • [DISCONTINUED] losartan (COZAAR) 25 mg tablet take 1 tablet by mouth daily (Patient not taking: Reported on 3/11/2024)   • [DISCONTINUED] semaglutide, 0.25 or 0.5 mg/dose, (Ozempic, 0.25 or 0.5 MG/DOSE,) 2 mg/3 mL injection pen Inject 0.75 mL (0.5 mg total) under the skin every 7 days       Objective     /98 (BP Location: Left arm, Patient Position: Sitting, Cuff Size: Adult)   Pulse 99   Temp 97.7 °F (36.5 °C) (Temporal)   Resp 17   Ht 6' 6.5\" (1.994 m)   Wt (!) 180 kg (397 lb)   SpO2 97%   BMI 45.30 kg/m²     Physical Exam  Vitals reviewed.   Constitutional:       General: He is not in acute distress.     Appearance: He is well-developed. He is obese. He is not diaphoretic.   HENT:      Head: Normocephalic and atraumatic.      Right Ear: Hearing, tympanic membrane, ear canal and external ear normal.      Left Ear: Hearing, tympanic membrane, ear canal and external ear normal.      Nose: Nose normal.      Mouth/Throat:      Mouth: Mucous membranes are moist.      Pharynx: Oropharynx is clear. Uvula midline. No oropharyngeal exudate.   Eyes:      General: No scleral icterus.        Right eye: No discharge.    "      Left eye: No discharge.      Conjunctiva/sclera: Conjunctivae normal.   Neck:      Thyroid: No thyromegaly.      Vascular: No carotid bruit.   Cardiovascular:      Rate and Rhythm: Normal rate and regular rhythm.      Heart sounds: Normal heart sounds. No murmur heard.  Pulmonary:      Effort: Pulmonary effort is normal. No respiratory distress.      Breath sounds: Normal breath sounds. No wheezing.   Abdominal:      General: Bowel sounds are normal. There is no distension.      Palpations: Abdomen is soft. There is no mass.      Tenderness: There is no abdominal tenderness. There is no guarding or rebound.   Musculoskeletal:         General: No tenderness. Normal range of motion.      Cervical back: Neck supple.   Lymphadenopathy:      Cervical: No cervical adenopathy.   Skin:     General: Skin is warm and dry.      Findings: No erythema or rash.   Neurological:      Mental Status: He is alert and oriented to person, place, and time.   Psychiatric:         Behavior: Behavior normal.         Thought Content: Thought content normal.         Judgment: Judgment normal.       Caty Win PA-C

## 2024-03-26 ENCOUNTER — OFFICE VISIT (OUTPATIENT)
Dept: FAMILY MEDICINE CLINIC | Facility: CLINIC | Age: 29
End: 2024-03-26
Payer: COMMERCIAL

## 2024-03-26 VITALS
WEIGHT: 315 LBS | HEIGHT: 78 IN | SYSTOLIC BLOOD PRESSURE: 138 MMHG | RESPIRATION RATE: 16 BRPM | TEMPERATURE: 97.7 F | OXYGEN SATURATION: 98 % | DIASTOLIC BLOOD PRESSURE: 90 MMHG | HEART RATE: 98 BPM | BODY MASS INDEX: 36.45 KG/M2

## 2024-03-26 DIAGNOSIS — I10 ESSENTIAL HYPERTENSION: ICD-10-CM

## 2024-03-26 PROCEDURE — 99213 OFFICE O/P EST LOW 20 MIN: CPT | Performed by: PHYSICIAN ASSISTANT

## 2024-03-26 RX ORDER — LOSARTAN POTASSIUM 25 MG/1
50 TABLET ORAL DAILY
Status: SHIPPED
Start: 2024-03-26

## 2024-03-26 NOTE — PROGRESS NOTES
Name: Jese Yap      : 1995      MRN: [id number]  Encounter Provider: Caty Win PA-C  Encounter Date: 3/26/2024   Encounter department: [facility]    Assessment & Plan     1. Essential hypertension  Assessment & Plan:  Increase losartan to 50 mg daily. RTO 2 weeks or sooner PRN.    Orders:  -     losartan (COZAAR) 25 mg tablet; Take 2 tablets (50 mg total) by mouth daily        Depression Screening and Follow-up Plan: Patient was screened for depression during today's encounter. They screened negative with a PHQ-2 score of 0.        Julieta Sawant is here today for 2 week follow up for HTN. BP improved but still not at goal.       Review of Systems   Constitutional:  Negative for activity change, appetite change, chills, diaphoresis, fatigue, fever and unexpected weight change.   HENT:  Negative for congestion, ear pain, postnasal drip, rhinorrhea, sinus pressure, sinus pain, sneezing, sore throat, tinnitus and voice change.    Eyes:  Negative for pain, redness and visual disturbance.   Respiratory:  Negative for cough, chest tightness, shortness of breath and wheezing.    Cardiovascular:  Negative for chest pain, palpitations and leg swelling.   Gastrointestinal:  Negative for abdominal pain, blood in stool, constipation, diarrhea, nausea and vomiting.   Genitourinary:  Negative for difficulty urinating, dysuria, frequency, hematuria and urgency.   Musculoskeletal:  Negative for arthralgias, back pain, gait problem, joint swelling, myalgias, neck pain and neck stiffness.   Skin:  Negative for color change, pallor, rash and wound.   Neurological:  Negative for dizziness, tremors, weakness, light-headedness and headaches.   Psychiatric/Behavioral:  Negative for dysphoric mood, self-injury, sleep disturbance and suicidal ideas. The patient is not nervous/anxious.        Current Outpatient Medications on File Prior to Visit   Medication Sig   • Acetaminophen (TYLENOL PO) Take by  "mouth Dose unknown, occasionally for arthritis. About 1-2 times weekly.   • ergocalciferol (VITAMIN D2) 50,000 units Take 1 capsule (50,000 Units total) by mouth once a week   • [DISCONTINUED] losartan (COZAAR) 25 mg tablet Take 1 tablet (25 mg total) by mouth daily       Objective     /90 (BP Location: Left arm, Patient Position: Sitting, Cuff Size: Adult)   Pulse 98   Temp 97.7 °F (36.5 °C) (Temporal)   Resp 16   Ht 6' 6.5\" (1.994 m)   Wt (!) 178 kg (392 lb)   SpO2 98%   BMI 44.72 kg/m²     Physical Exam  Vitals reviewed.   Constitutional:       General: He is not in acute distress.     Appearance: He is well-developed. He is not diaphoretic.   HENT:      Head: Normocephalic and atraumatic.      Right Ear: Hearing, tympanic membrane, ear canal and external ear normal.      Left Ear: Hearing, tympanic membrane, ear canal and external ear normal.      Nose: Nose normal.      Mouth/Throat:      Mouth: Mucous membranes are moist.      Pharynx: Oropharynx is clear. Uvula midline. No oropharyngeal exudate.   Eyes:      General: No scleral icterus.        Right eye: No discharge.         Left eye: No discharge.      Conjunctiva/sclera: Conjunctivae normal.   Neck:      Thyroid: No thyromegaly.      Vascular: No carotid bruit.   Cardiovascular:      Rate and Rhythm: Normal rate and regular rhythm.      Heart sounds: Normal heart sounds. No murmur heard.  Pulmonary:      Effort: Pulmonary effort is normal. No respiratory distress.      Breath sounds: Normal breath sounds. No wheezing.   Abdominal:      General: Bowel sounds are normal. There is no distension.      Palpations: Abdomen is soft. There is no mass.      Tenderness: There is no abdominal tenderness. There is no guarding or rebound.   Musculoskeletal:         General: No tenderness. Normal range of motion.      Cervical back: Neck supple.   Lymphadenopathy:      Cervical: No cervical adenopathy.   Skin:     General: Skin is warm and dry.      " Findings: No erythema or rash.   Neurological:      Mental Status: He is alert and oriented to person, place, and time.   Psychiatric:         Behavior: Behavior normal.         Thought Content: Thought content normal.         Judgment: Judgment normal.       Caty Win PA-C

## 2024-06-27 ENCOUNTER — HOSPITAL ENCOUNTER (EMERGENCY)
Facility: HOSPITAL | Age: 29
Discharge: HOME | End: 2024-06-27
Attending: EMERGENCY MEDICINE
Payer: COMMERCIAL

## 2024-06-27 ENCOUNTER — APPOINTMENT (EMERGENCY)
Dept: RADIOLOGY | Facility: HOSPITAL | Age: 29
End: 2024-06-27
Payer: COMMERCIAL

## 2024-06-27 VITALS
BODY MASS INDEX: 36.45 KG/M2 | HEIGHT: 78 IN | DIASTOLIC BLOOD PRESSURE: 85 MMHG | WEIGHT: 315 LBS | SYSTOLIC BLOOD PRESSURE: 138 MMHG | HEART RATE: 111 BPM | TEMPERATURE: 97.6 F | RESPIRATION RATE: 18 BRPM | OXYGEN SATURATION: 96 %

## 2024-06-27 DIAGNOSIS — M25.562 LEFT KNEE PAIN: Primary | ICD-10-CM

## 2024-06-27 DIAGNOSIS — M76.52 PATELLAR TENDINITIS, LEFT KNEE: ICD-10-CM

## 2024-06-27 PROCEDURE — 73564 X-RAY EXAM KNEE 4 OR MORE: CPT

## 2024-06-27 PROCEDURE — 99284 EMERGENCY DEPT VISIT MOD MDM: CPT | Performed by: EMERGENCY MEDICINE

## 2024-06-27 PROCEDURE — 99283 EMERGENCY DEPT VISIT LOW MDM: CPT

## 2024-06-27 RX ORDER — DEXAMETHASONE 4 MG/1
10 TABLET ORAL ONCE
Status: COMPLETED | OUTPATIENT
Start: 2024-06-27 | End: 2024-06-27

## 2024-06-27 RX ORDER — METHYLPREDNISOLONE 4 MG/1
TABLET ORAL
Qty: 21 TABLET | Refills: 0 | Status: SHIPPED | OUTPATIENT
Start: 2024-06-27

## 2024-06-27 RX ORDER — OXYCODONE HYDROCHLORIDE AND ACETAMINOPHEN 5; 325 MG/1; MG/1
1 TABLET ORAL ONCE
Status: COMPLETED | OUTPATIENT
Start: 2024-06-27 | End: 2024-06-27

## 2024-06-27 RX ADMIN — DEXAMETHASONE 10 MG: 4 TABLET ORAL at 12:51

## 2024-06-27 RX ADMIN — OXYCODONE HYDROCHLORIDE AND ACETAMINOPHEN 1 TABLET: 5; 325 TABLET ORAL at 12:51

## 2024-06-27 NOTE — ED ATTENDING ATTESTATION
I, Erik Rosario DO, saw and evaluated the patient. I have discussed the patient with the resident/non-physician practitioner and agree with the resident's/non-physician practitioner's findings, Plan of Care, and MDM as documented in the resident's/non-physician practitioner's note, except where noted. All available labs and Radiology studies were reviewed.      At this point I agree with the current assessment done in the Emergency Department.  I have conducted an independent evaluation of this patient including a focused history and a physical exam.    ED Note - Jese Yap 29 y.o. male MRN: [id number]  Unit/Bed#: RM12 Encounter: [id number]    History of Present Illness   HPI  Jese Yap is a 29 y.o. male who presents for evaluation of left knee pain.  Patient works delivering newspapers on a bicycle.  Patient states that the left knee pain onset approximately 3 days ago and is localized to the patellar tendon.  There is very minimal swelling without erythema or crepitus.  No fever or chills.  There is significant limited range of motion due to the pain.  Patient did try Tylenol with minimal relief.  No other complaints at this time.  No chest pain, shortness of breath, nausea or vomiting, dizziness or diaphoresis.  No lower extremity edema or persistent swelling.  On exam, the patient is very well-appearing and not in acute distress.    REVIEW OF SYSTEMS  See HPI for further details. 12 systems reviewed and otherwise negative except as noted.   Historical Information     PAST MEDICAL HISTORY  Past Medical History:   Diagnosis Date    Depression        FAMILY HISTORY  Family History   Problem Relation Age of Onset    Cancer Mother     Diabetes Mother     Hypertension Mother     Obesity Mother     Hypothyroidism Mother     Mental illness Mother     Diabetes Father     Obesity Father     Vascular Disease Father        SOCIAL HISTORY  Social History     Socioeconomic History    Marital status: Single      "Spouse name: None    Number of children: None    Years of education: None    Highest education level: None   Occupational History    None   Tobacco Use    Smoking status: Never    Smokeless tobacco: Never   Vaping Use    Vaping status: Never Used   Substance and Sexual Activity    Alcohol use: No    Drug use: No    Sexual activity: None   Other Topics Concern    None   Social History Narrative    None     Social Determinants of Health     Financial Resource Strain: Not on file   Food Insecurity: Not on file   Transportation Needs: Not on file   Physical Activity: Not on file   Stress: Not on file   Social Connections: Not on file   Intimate Partner Violence: Not on file   Housing Stability: Not on file       SURGICAL HISTORY  Past Surgical History:   Procedure Laterality Date    IR BIOPSY KIDNEY RANDOM  5/25/2021     Meds/Allergies     CURRENT MEDICATIONS  No current facility-administered medications for this encounter.    Current Outpatient Medications:     Acetaminophen (TYLENOL PO), Take by mouth Dose unknown, occasionally for arthritis. About 1-2 times weekly., Disp: , Rfl:     losartan (COZAAR) 25 mg tablet, Take 2 tablets (50 mg total) by mouth daily, Disp: , Rfl:     ergocalciferol (VITAMIN D2) 50,000 units, Take 1 capsule (50,000 Units total) by mouth once a week (Patient not taking: Reported on 6/27/2024), Disp: 12 capsule, Rfl: 1  Not in a hospital admission.    ALLERGIES  No Known Allergies  Objective     PHYSICAL EXAM    VITAL SIGNS: Blood pressure 127/83, pulse (!) 121, temperature 97.6 °F (36.4 °C), temperature source Temporal, resp. rate 18, height 6' 6.5\" (1.994 m), weight (!) 178 kg (392 lb), SpO2 96%.    Constitutional:  Appears well developed and well nourished, no acute distress, non-toxic appearance   Eyes:  PERRL, EOMI, conjunctivae pink, sclerae non-icteric    HENT:  Normocephalic/Atraumatic, no rhinorrhea, mucous membranes moist   Neck: normal range of motion, no tenderness, supple " "  Respiratory:  No respiratory distress, normal breath sounds   Cardiovascular:  Normal rate, normal rhythm    GI:  Soft, no tenderness, non-distended  :  No CVAT, no flank ecchymosis  Musculoskeletal: Left lower extremity/left knee: Tenderness noted to the patellar tendon without overlying erythema or crepitus.  There is very minimal swelling about the area with no significant knee effusion.  Limited range of motion of the knee due to pain.  No laxity of the knee joint however.  Neurovascular intact.  Integument:  Pink, warm, dry, Well hydrated, no rash, no erythema, no bullae   Lymphatic:  No cervical/ tonsillar/ submandibular lymphadenopathy noted   Neurologic:  Awake, Alert & oriented x 3, CN 2-12 intact, no focal neurological deficits, motor function intact  Psychiatric:  Speech and behavior appropriate       ED COURSE and MDM:    Assessment & Plan   Assessment:  Jese Yap is a 29 y.o. male presents for evaluation of left knee pain.    Plan:  Symptom management, x-ray, orthopedic follow-up.      CRITICAL CARE TIME:   0      Portions of the record may have been created with voice recognition software. Occasional wrong word or \"sound a like\" substitutions may have occurred due to the inherent limitations of voice recognition software.     ED Provider  Electronically Signed by  "

## 2024-06-27 NOTE — ED PROVIDER NOTES
History  Chief Complaint   Patient presents with    Knee Pain     Patient reports pain in left knee since Monday without injury. Took tylenol yesterday without relief.      29 y.o. male with past medical history of CKD3, HTN, focal segmental glomerulosclerosis, and obesity who presents with four day history of left knee pain. His pain first began on Monday and on Tuesday he felt as though he could no longer move it. Any movement provokes the pain. Patient works delivering newspapers on a bicycle. No history of similar presentations. The pain does not radiate. He has tried over-the-counter Tylenol with no relief. Denies shortness of breath, chest pain, nausea, vomiting, abdominal pain, syncope, headache, fever, and chills.          Prior to Admission Medications   Prescriptions Last Dose Informant Patient Reported? Taking?   Acetaminophen (TYLENOL PO) 6/26/2024  Yes Yes   Sig: Take by mouth Dose unknown, occasionally for arthritis. About 1-2 times weekly.   ergocalciferol (VITAMIN D2) 50,000 units Not Taking  No No   Sig: Take 1 capsule (50,000 Units total) by mouth once a week   Patient not taking: Reported on 6/27/2024   losartan (COZAAR) 25 mg tablet Past Month  No Yes   Sig: Take 2 tablets (50 mg total) by mouth daily      Facility-Administered Medications: None       Past Medical History:   Diagnosis Date    Depression        Past Surgical History:   Procedure Laterality Date    IR BIOPSY KIDNEY RANDOM  5/25/2021       Family History   Problem Relation Age of Onset    Cancer Mother     Diabetes Mother     Hypertension Mother     Obesity Mother     Hypothyroidism Mother     Mental illness Mother     Diabetes Father     Obesity Father     Vascular Disease Father      I have reviewed and agree with the history as documented.    E-Cigarette/Vaping    E-Cigarette Use Never User      E-Cigarette/Vaping Substances    Nicotine No     THC No     CBD No     Flavoring No     Other No     Unknown No      Social History      Tobacco Use    Smoking status: Never    Smokeless tobacco: Never   Vaping Use    Vaping status: Never Used   Substance Use Topics    Alcohol use: No    Drug use: No        Review of Systems   Constitutional:  Negative for chills and fever.   Respiratory:  Negative for cough, shortness of breath and wheezing.    Cardiovascular:  Negative for chest pain.   Gastrointestinal:  Negative for abdominal pain, constipation, diarrhea, nausea and vomiting.   Genitourinary:  Negative for dysuria, hematuria and urgency.   Musculoskeletal:  Positive for arthralgias and gait problem.   Neurological:  Negative for tremors, seizures, syncope, weakness, light-headedness and headaches.   All other systems reviewed and are negative.      Physical Exam  ED Triage Vitals [06/27/24 1105]   Temperature Pulse Respirations Blood Pressure SpO2   97.6 °F (36.4 °C) (!) 128 18 137/91 98 %      Temp Source Heart Rate Source Patient Position - Orthostatic VS BP Location FiO2 (%)   Temporal Monitor Sitting Right arm --      Pain Score       2             Orthostatic Vital Signs  Vitals:    06/27/24 1105 06/27/24 1200 06/27/24 1300   BP: 137/91 127/83 138/85   Pulse: (!) 128 (!) 121 (!) 111   Patient Position - Orthostatic VS: Sitting Sitting Sitting       Physical Exam  Vitals and nursing note reviewed.   Constitutional:       General: He is not in acute distress.     Appearance: He is obese.   HENT:      Head: Normocephalic and atraumatic.   Eyes:      Extraocular Movements: Extraocular movements intact.      Conjunctiva/sclera: Conjunctivae normal.      Pupils: Pupils are equal, round, and reactive to light.   Cardiovascular:      Rate and Rhythm: Regular rhythm. Tachycardia present.      Pulses: Normal pulses.      Heart sounds: Normal heart sounds.   Pulmonary:      Effort: Pulmonary effort is normal.   Abdominal:      Tenderness: There is no guarding or rebound.   Musculoskeletal:      Cervical back: Normal range of motion and neck supple.       Right knee: Normal.      Comments: Pain provoked on left with valgus stress test; unable to attempt Rj, anterior/poster drawer test d/t limited ROM. Pain provoked on palpation of patellar tendon.    Skin:     General: Skin is warm and dry.   Neurological:      General: No focal deficit present.      Mental Status: He is alert and oriented to person, place, and time.         ED Medications  Medications   dexamethasone (DECADRON) tablet 10 mg (10 mg Oral Given 6/27/24 1251)   oxyCODONE-acetaminophen (PERCOCET) 5-325 mg per tablet 1 tablet (1 tablet Oral Given 6/27/24 1251)       Diagnostic Studies  Results Reviewed       None                   XR knee 4+ vw left injury   ED Interpretation by Erik Rosario DO (06/27 1334)   No obvious acute osseous abnormality.            Procedures  Procedures      ED Course  ED Course as of 06/27/24 1502   Thu Jun 27, 2024   1110 Pulse(!): 128  Tachycardia noted on initial assessment   1251 Given decadron and percocet for pain management   1340 XR knee 4+ vw left injury  No obvious acute osseous abnormality.   1341 Reports partial improvement of pain on reassessment    1352 Discussed follow-up with PCP with pt and referral to orthopedic surgery placed; agreeable to discharge at this time                   SBIRT 22yo+      Flowsheet Row Most Recent Value   Initial Alcohol Screen: US AUDIT-C     1. How often do you have a drink containing alcohol? 0 Filed at: 06/27/2024 1107   2. How many drinks containing alcohol do you have on a typical day you are drinking?  0 Filed at: 06/27/2024 1107   3a. Male UNDER 65: How often do you have five or more drinks on one occasion? 0 Filed at: 06/27/2024 1107   3b. FEMALE Any Age, or MALE 65+: How often do you have 4 or more drinks on one occassion? 0 Filed at: 06/27/2024 1107   Audit-C Score 0 Filed at: 06/27/2024 1107   RESHMA: How many times in the past year have you...    Used an illegal drug or used a prescription medication  for non-medical reasons? Never Filed at: 06/27/2024 1107                  Medical Decision Making  29 y.o. male presents with four day history of knee pain; pain limits ROM and is provoked with valgus stress test. Differential diagnosis includes fracture, dislocation, tendon tear, tendonitis. Will obtain left knee XR and treat with decadron and percocet for pain management.     X-ray of the left knee shows no acute osseous abnormality. Discussed results with patient and likely need for further imaging with MRI to assess soft tissue and tendon pathology. Instructed patient to follow-up with PCP and placed referral for outpatient orthopedics; patient agreeable to this plan. A prescription for Medrol Dosepak was sent to the pharmacy. He was encouraged to return to the ED if his symptoms worsen or fail to improve.    Amount and/or Complexity of Data Reviewed  Radiology: ordered and independent interpretation performed. Decision-making details documented in ED Course.    Risk  Prescription drug management.          Disposition  Final diagnoses:   Left knee pain   Patellar tendinitis, left knee     Time reflects when diagnosis was documented in both MDM as applicable and the Disposition within this note       Time User Action Codes Description Comment    6/27/2024 12:35 PM MaricarmenElenaPriya Add [M25.562] Left knee pain     6/27/2024  1:42 PM Maricarmen, Priya Add [M25.562] Acute pain of left knee     6/27/2024  1:43 PM MaricarmenJoshPriya Add [M76.52] Patellar tendinitis, left knee     6/27/2024  1:43 PM Maricarmen Priya Remove [M25.562] Acute pain of left knee           ED Disposition       ED Disposition   Discharge    Condition   Stable    Date/Time   Thu Jun 27, 2024 1344    Comment   Jese Yap discharge to home/self care.                   Follow-up Information       Follow up With Specialties Details Why Contact Info Additional Information    Caty Win PA-C Physician Assistant Go in 1 week  [address]  [facility] [address]  [phone]       [facility] Emergency Department Emergency Medicine Go to  If symptoms worsen [address] [facility] [address]  [phone] [facility] Emergency Department, [address], [place], [address]            Discharge Medication List as of 6/27/2024  1:45 PM        START taking these medications    Details   methylPREDNISolone 4 MG tablet therapy pack Use as directed on package, Normal           CONTINUE these medications which have NOT CHANGED    Details   Acetaminophen (TYLENOL PO) Take by mouth Dose unknown, occasionally for arthritis. About 1-2 times weekly., Historical Med      losartan (COZAAR) 25 mg tablet Take 2 tablets (50 mg total) by mouth daily, Starting Tue 3/26/2024, No Print      ergocalciferol (VITAMIN D2) 50,000 units Take 1 capsule (50,000 Units total) by mouth once a week, Starting Mon 3/11/2024, Normal               PDMP Review       None             ED Provider  Attending physically available and evaluated Jese Yap. I managed the patient along with the ED Attending.    Electronically Signed by           Priya Hernadez MD  06/27/24 3756

## 2024-07-12 ENCOUNTER — OFFICE VISIT (OUTPATIENT)
Dept: OBGYN CLINIC | Facility: CLINIC | Age: 29
End: 2024-07-12
Payer: COMMERCIAL

## 2024-07-12 VITALS
OXYGEN SATURATION: 99 % | BODY MASS INDEX: 36.45 KG/M2 | WEIGHT: 315 LBS | TEMPERATURE: 98.7 F | HEART RATE: 101 BPM | HEIGHT: 78 IN | SYSTOLIC BLOOD PRESSURE: 135 MMHG | DIASTOLIC BLOOD PRESSURE: 88 MMHG

## 2024-07-12 DIAGNOSIS — S83.512A SPRAIN OF ANTERIOR CRUCIATE LIGAMENT OF LEFT KNEE, INITIAL ENCOUNTER: Primary | ICD-10-CM

## 2024-07-12 PROCEDURE — 99204 OFFICE O/P NEW MOD 45 MIN: CPT | Performed by: FAMILY MEDICINE

## 2024-07-12 NOTE — PROGRESS NOTES
Subjective:    Chief Complaint   Patient presents with    Left Knee - Pain     Pain and swelling started suddenly on 6/23, leaving him unable to bend his knee.  Went to ER and was given a steroid pack.  He states it did help a little.        Jese Yap is a 29 y.o. male complains of left knee pain. Onset of the symptoms was several weeks ago.  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 Aggravating factors:  deep flexion of the knee . Treatment to date:  oral medrol dosepack  . Symptoms have gradually improved.      The following portions were reviewed and updated as needed: allergies, current medications, past medical history, past social history, past surgical history and problem list.    Review of Systems   Constitutional: Negative for fever.   HENT: Negative for dental problem and headaches.    Eyes: Negative for vision loss.   Respiratory: Negative for cough and shortness of breath.    Cardiovascular: Negative for leg swelling and palpitations.   Gastrointestinal: Negative for constipation and diarrhea.   Genitourinary: Negative for bladder incontinence and difficulty urinating.   Musculoskeletal: Negative for back pain and difficulty walking.   Skin: Negative for rash and ulcer.   Neurological: Negative for dizziness and headaches.   Hem/Lymph/Immuno: Negative for blood clots. Does not bruise/bleed easily.   Psychiatric/Behavioral: Negative for confusion.         Objective:  General: no acute distress, non toxic, AAO x3   Skin: no skin changes, no rashes, no wounds or laceration  Vasculature: normal cap refill, no LE edema, normal popliteal and dorsalis pedis pulse  Neurologic:   Musculoskeletal: left KNEE EXAM  Gait: limping gait negative, able to weight bear without difficulty  Inspection: No gross deformity, no redness or warmth   Effusion: negative   Medial joint line TTP: negative  Lateral joint line  TTP: negative  ROM: Full flexion and extension  Carissa's: negative,   Thesalys: negative  Instability to varus/valgus stress: negative  Anterior Drawer: negative   Lachman's test: negative  Posterior Drawer: negative          Imaging:       Assessment/Plan:  1. Sprain of anterior cruciate ligament of left knee, initial encounter  Patient exhibits no pain symptoms on examination and reports subjective resolution of pain aside from terminal flexion of the knee.  Clinical impression is that the patient has likely suffered from sprain of the knee versus possible patellar tendinitis given prior emergency room note.  He responded well to the oral Medrol dose pack course.  I am encouraging the patient to begin with physical therapy and return in about 5 weeks for reevaluation.  Return precautions were provided.  If symptoms are persisting we will order an MRI for further evaluation.  Unfortunately the patient cannot tolerate NSAID medications kidney disease-recommending continue Tylenol for pain relief.  - Ambulatory Referral to Orthopedic Surgery  - Ambulatory Referral to Physical Therapy; Future

## 2024-07-29 ENCOUNTER — EVALUATION (OUTPATIENT)
Dept: PHYSICAL THERAPY | Facility: HOME HEALTHCARE | Age: 29
End: 2024-07-29
Payer: COMMERCIAL

## 2024-07-29 DIAGNOSIS — S83.512A SPRAIN OF ANTERIOR CRUCIATE LIGAMENT OF LEFT KNEE, INITIAL ENCOUNTER: ICD-10-CM

## 2024-07-29 PROCEDURE — 97110 THERAPEUTIC EXERCISES: CPT | Performed by: PHYSICAL THERAPIST

## 2024-07-29 PROCEDURE — 97530 THERAPEUTIC ACTIVITIES: CPT | Performed by: PHYSICAL THERAPIST

## 2024-07-29 PROCEDURE — 97161 PT EVAL LOW COMPLEX 20 MIN: CPT | Performed by: PHYSICAL THERAPIST

## 2024-07-29 NOTE — PROGRESS NOTES
"PT Evaluation     Today's date: 2024  Patient name: Jese Yap  : 1995  MRN: [id number]  Referring provider: Kamar Christianson DO  Dx: No diagnosis found.               Assessment  Impairments: abnormal gait, abnormal or restricted ROM, activity intolerance, impaired balance, impaired physical strength, lacks appropriate home exercise program, pain with function, safety issue, weight-bearing intolerance, activity limitations and endurance    Assessment details: Pt Jese Yap is a 29 y.o. who presents to OPPT with s/s consistent with L knee patellar tendonitis. Pt presenting with limited knee mobility, decreased LE strength, gait/balance dysfunction, joint inflammation, and increased pain with Wb'ing activities. Pt would benefit from skilled therapy services to address outlined impairments, work towards goals, and restore pts PLOF. Thank you!    Understanding of Dx/Px/POC: good     Prognosis: good    Goals  STGs to be achieved in 4 weeks:  -Pt to demonstrate reduced subjective pain rating \"at worst\" by at least 2-3 points from Initial Eval to allow for reduced pain with ADLs and improved functional activity tolerance.   -Pt to demonstrate L knee ROM improved by 5-10* in order to maximize joint mobility and function and allow for progression of exercise program and achievement of goals.   -Pt to demonstrate increased MMT of L LE by at least 1/2 grade in order to improve safety and stability with ADLs and functional mobility.     LTGs to be achieved upon discharge:   -Pt will be I with HEP in order to continue to improve quality of life and independence and reduce risk for re-injury.   -Pt to demonstrate return to activities of daily living without limiations or restrictions.   -Pt will return to ambulation > 1 hour to help facilitate return to community activities independently   -Pt to demonstrate return to work activities without limitations or restrictions.   -Pt to demonstrate improved function as " noted by achieving or exceeding predicted score on FOTO outcomes assessment tool.          Plan  Patient would benefit from: skilled physical therapy  Planned modality interventions: thermotherapy: hydrocollator packs and cryotherapy    Planned therapy interventions: manual therapy, neuromuscular re-education, home exercise program, functional ROM exercises, gait training, flexibility, therapeutic exercise, therapeutic activities, patient education and balance    Frequency: 2x week  Duration in weeks: 12  Plan of Care beginning date: 2024  Plan of Care expiration date: 10/21/2024  Treatment plan discussed with: patient and referring physician      Subjective Evaluation    History of Present Illness  Mechanism of injury: Per Orthopedic MD note:  Jese Yap is a 29 y.o. male complains of left knee pain. Onset of the symptoms was several weeks ago.  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 Aggravating factors:  deep flexion of the knee . Treatment to date:  oral medrol dosepack  . Symptoms have gradually improved.  MD referral to OPPT; pt to return to MD again 24.  Quality of life: good    Patient Goals  Patient goals for therapy: decreased edema, decreased pain, improved balance, increased motion, increased strength, independence with ADLs/IADLs and return to work    Pain  At best pain ratin  At worst pain ratin  Quality: dull ache and sharp  Relieving factors: medications (OTC prn)  Aggravating factors: standing, walking and stair climbing (biking)    Social Support  Stairs in house: yes     Employment status: working    Diagnostic Tests  X-ray: normal  Treatments  Current treatment: physical therapy      Objective     Neurological Testing     Sensation     Knee   Left Knee   Intact: Light touch    Right Knee   Intact: light touch     Active Range of Motion   Left Knee   Flexion: 83 degrees with  pain  Extension: -12 degrees with pain    Right Knee   Normal active range of motion    Strength/Myotome Testing     Left Knee   Flexion: 3-  Extension: 3-  Quadriceps contraction: fair    Right Knee   Normal strength  Quadriceps contraction: good    Tests     Left Knee   Positive patellar compression.   Negative anterior drawer and posterior drawer.     Ambulation     Ambulation: Stairs   Pattern: reciprocal  Railings: one rail  Pattern: non-reciprocal  Railings: one rail             Re-eval Date: 8/29/24     Precautions: none       Manuals 7/29       Laser   Sub acute   Patellar tendonitis                                 Neuro Re-Ed         Bosu lunges         Balance as appropriate                                                 Ther Ex        NuStep         HR/TR        Standing hip flex/abd/ext        Squats        LAQ        QS HEP       Heel slides  HEP       Hip add        Hip abd        SLR        S/L SLR        Clamshells        Bridges         Self calf stretch        Self HS stretch                 Ther Activity        Step-ups  Fwd/Lat        Step-down         Reviewed activity modification and use of CP to decreased inflammatory cycle                Gait Training                        Modalities        CP prn

## 2024-08-01 ENCOUNTER — OFFICE VISIT (OUTPATIENT)
Dept: PHYSICAL THERAPY | Facility: HOME HEALTHCARE | Age: 29
End: 2024-08-01
Payer: COMMERCIAL

## 2024-08-01 DIAGNOSIS — S83.512A SPRAIN OF ANTERIOR CRUCIATE LIGAMENT OF LEFT KNEE, INITIAL ENCOUNTER: Primary | ICD-10-CM

## 2024-08-01 PROCEDURE — 97140 MANUAL THERAPY 1/> REGIONS: CPT

## 2024-08-01 PROCEDURE — 97110 THERAPEUTIC EXERCISES: CPT

## 2024-08-01 NOTE — PROGRESS NOTES
"Daily Note     Today's date: 2024  Patient name: Jese Yap  : 1995  MRN: [id number]  Referring provider: Kamar Christianson DO  Dx:   Encounter Diagnosis     ICD-10-CM    1. Sprain of anterior cruciate ligament of left knee, initial encounter  S83.512A                      Subjective: Pt reports his L knee was hurting more the other day but today it's not too bad. Pt reports 3/10 pain L knee.       Objective: See treatment diary below      Assessment: Tolerated treatment fairly well. Verbal cues needed for correct form with exercises. ROM and strength deficits noted L knee. Pt with good tolerance to laser with good skin integrity after laser. Pt with no c/o increased pain L knee t/o session or at end of session. Patient would benefit from continued PT      Plan: Continue per plan of care.      Re-eval Date: 24     Precautions: none       Manuals       Laser   Sub acute   Patellar tendonitis   JK                              Neuro Re-Ed         Luisu elvia         Balance as appropriate                                                 Ther Ex        NuStep   L1  10 min       HR/TR  1x15 ea       Standing hip flex/abd/ext  1x 10 ea Juan       Squats        LAQ  5\" 1x15       QS HEP 5\"x 15       Heel slides  HEP 1x 15       Hip add  5\"x 15       Hip abd  Red 1x15       SLR  1x15       S/L SLR        Clamshells        Bridges   1x15       Self calf stretch  20\"x 3      Self HS stretch   20\"x 3               Ther Activity        Step-ups  Fwd/Lat        Step-down         Reviewed activity modification and use of CP to decreased inflammatory cycle                Gait Training                        Modalities        CP prn                         "

## 2024-08-05 ENCOUNTER — OFFICE VISIT (OUTPATIENT)
Dept: PHYSICAL THERAPY | Facility: HOME HEALTHCARE | Age: 29
End: 2024-08-05
Payer: COMMERCIAL

## 2024-08-05 DIAGNOSIS — S83.512A SPRAIN OF ANTERIOR CRUCIATE LIGAMENT OF LEFT KNEE, INITIAL ENCOUNTER: Primary | ICD-10-CM

## 2024-08-05 PROCEDURE — 97140 MANUAL THERAPY 1/> REGIONS: CPT

## 2024-08-05 PROCEDURE — 97530 THERAPEUTIC ACTIVITIES: CPT

## 2024-08-05 PROCEDURE — 97110 THERAPEUTIC EXERCISES: CPT

## 2024-08-05 NOTE — PROGRESS NOTES
"Daily Note     Today's date: 2024  Patient name: Jese Yap  : 1995  MRN: [id number]  Referring provider: Kamar Christianson DO  Dx:   Encounter Diagnosis     ICD-10-CM    1. Sprain of anterior cruciate ligament of left knee, initial encounter  S83.512A                  Subjective: Pt reports he has 6/10 pain in his L knee today.       Objective: See treatment diary below      Assessment: Tolerated treatment well. Verbal cues needed for correct form with exercises. Progressed to squats, step ups fwd/lat and step downs today. Pt with good tolerance to laser with good skin integrity after laser. Pt with no c/o increased pain L knee t/o session or at end of session. Patient would benefit from continued PT      Plan: Continue per plan of care.      Re-eval Date: 24     Precautions: none       Manuals      Laser   Sub acute   Patellar tendonitis   JK JK                             Neuro Re-Ed         Bosu lunges         Balance as appropriate                                                 Ther Ex        NuStep   L1  10 min  L1 10 min      HR/TR  1x15 ea  1x15 ea      Standing hip flex/abd/ext  1x 10 ea Juan  1x10 ea Juan      Squats   1x10      LAQ  5\" 1x15  5\" 1x15      QS HEP 5\"x 15  5\" x 15      Heel slides  HEP 1x 15  1x15      Hip add  5\"x 15  5\" x 15      Hip abd  Red 1x15  Red 1x15      SLR  1x15  1x15      S/L SLR        Clamshells        Bridges   1x15  1x15      Self calf stretch  20\"x 3 20\"x 3      Self HS stretch   20\"x 3  20\"x 3              Ther Activity        Step-ups  Fwd/Lat   C Fwd/Lat  1x15 ea      Step-down   C 1x15       Reviewed activity modification and use of CP to decreased inflammatory cycle                Gait Training                        Modalities        CP prn                           "

## 2024-08-08 ENCOUNTER — APPOINTMENT (OUTPATIENT)
Dept: PHYSICAL THERAPY | Facility: HOME HEALTHCARE | Age: 29
End: 2024-08-08
Payer: COMMERCIAL

## 2024-08-12 ENCOUNTER — OFFICE VISIT (OUTPATIENT)
Dept: PHYSICAL THERAPY | Facility: HOME HEALTHCARE | Age: 29
End: 2024-08-12
Payer: COMMERCIAL

## 2024-08-12 DIAGNOSIS — S83.512A SPRAIN OF ANTERIOR CRUCIATE LIGAMENT OF LEFT KNEE, INITIAL ENCOUNTER: Primary | ICD-10-CM

## 2024-08-12 PROCEDURE — 97530 THERAPEUTIC ACTIVITIES: CPT

## 2024-08-12 PROCEDURE — 97110 THERAPEUTIC EXERCISES: CPT

## 2024-08-12 PROCEDURE — 97112 NEUROMUSCULAR REEDUCATION: CPT

## 2024-08-12 NOTE — PROGRESS NOTES
"Daily Note     Today's date: 2024  Patient name: Jese Yap  : 1995  MRN: [id number]  Referring provider: Kamar Christianson DO  Dx:   Encounter Diagnosis     ICD-10-CM    1. Sprain of anterior cruciate ligament of left knee, initial encounter  S83.512A                      Subjective: Pt reports his L knee has been feeling good but has some pain in his R knee.       Objective: See treatment diary below      Assessment: Tolerated treatment well. Progressed to balance activities and  increased reps with most exercises today with good tolerance. Pt challenged with balance on foam. Trial of holding laser today. Pt with no c/o pain L knee t/o session or at end of session. Patient would benefit from continued PT      Plan: Continue per plan of care.      Re-eval Date: 24     Precautions: none       Manuals     Laser   Sub acute   Patellar tendonitis   JK JK Hold                             Neuro Re-Ed         Bosu lunges     1x15 Juan     Balance as appropriate     Romberg foam   EO/EC   30\"x 1 ea   0 HHA         Tandem stance firm   30\" x 1 ea R/L  0 HHA        Side stepping foam <> x 3  1 HHA        Tandem amb   Foam <> x 3   1 HHA                     Ther Ex        NuStep   L1  10 min  L1 10 min  L1 10 min     HR/TR  1x15 ea  1x15 ea  1x15 ea     Standing hip flex/abd/ext  1x 10 ea Juan  1x10 ea Juan  1x15 ea Juan    Squats   1x10  1x15     LAQ  5\" 1x15  5\" 1x15  5\" x 20    QS HEP 5\"x 15  5\" x 15  5\"x 20     Heel slides  HEP 1x 15  1x15  1x20     Hip add  5\"x 15  5\" x 15  5\"x 20     Hip abd  Red 1x15  Red 1x15  Red 1x20     SLR  1x15  1x15  2x10     S/L SLR        Clamshells        Bridges   1x15  1x15  2x10     Self calf stretch  20\"x 3 20\"x 3  20\"x 3     Self HS stretch   20\"x 3  20\"x 3  20\"x 3             Ther Activity        Step-ups  Fwd/Lat   C Fwd/Lat  1x15 ea  C Fwd/Lat  1x15 ea Juan    Step-down   C 1x15  C 1x15 Juan      Reviewed activity modification and use of CP to decreased " inflammatory cycle                Gait Training                        Modalities        CP prn

## 2024-08-15 ENCOUNTER — OFFICE VISIT (OUTPATIENT)
Dept: PHYSICAL THERAPY | Facility: HOME HEALTHCARE | Age: 29
End: 2024-08-15
Payer: COMMERCIAL

## 2024-08-15 DIAGNOSIS — S83.512A SPRAIN OF ANTERIOR CRUCIATE LIGAMENT OF LEFT KNEE, INITIAL ENCOUNTER: Primary | ICD-10-CM

## 2024-08-15 PROCEDURE — 97530 THERAPEUTIC ACTIVITIES: CPT

## 2024-08-15 PROCEDURE — 97112 NEUROMUSCULAR REEDUCATION: CPT

## 2024-08-15 PROCEDURE — 97110 THERAPEUTIC EXERCISES: CPT

## 2024-08-15 NOTE — PROGRESS NOTES
"Daily Note     Today's date: 8/15/2024  Patient name: Jese Yap  : 1995  MRN: [id number]  Referring provider: Kamar Christianson DO  Dx:   Encounter Diagnosis     ICD-10-CM    1. Sprain of anterior cruciate ligament of left knee, initial encounter  S83.512A                  Subjective: Pt reports his L knee has been feeling good and he has no pain.       Objective: See treatment diary below      Assessment: Tolerated treatment well. Verbal cues needed for correct form with exercises. Progressed to increased reps with TR/HR, squats, standing hip flex/abd/ext, step downs and step ups fwd/lateral today. Pt with no c/o pain L knee t/o session or at end of session.       Plan: Pt placed on hold from PT for one week and then Pt will call PT clinic to either D/C or continue PT depending on how his L knee is feeling.      Re-eval Date: 24     Precautions: none       Manuals 7/29 8/1 8/5 8/12 8/15   Laser   Sub acute   Patellar tendonitis   JK JK Hold  Hold                            Neuro Re-Ed         Bosu lunges     1x15 Juan  1x15 Juan    Balance as appropriate     Romberg foam   EO/EC   30\"x 1 ea   0 HHA  Romberg foam   EO/EC  30\" x 1 ea   0 HHA        Tandem stance firm   30\" x 1 ea R/L  0 HHA Tandem stance firm  30\" x 1 ea R/L  0 HHA        Side stepping foam <> x 3  1 HHA Side stepping   Foam <> x 3   0 HHA        Tandem amb   Foam <> x 3   1 HHA  Tandem amb  Foam <> x 3  0 HHA                    Ther Ex        NuStep   L1  10 min  L1 10 min  L1 10 min  L2 10 min    HR/TR  1x15 ea  1x15 ea  1x15 ea  1x20 ea    Standing hip flex/abd/ext  1x 10 ea Juan  1x10 ea Juan  1x15 ea Juan 1x20 ea Juan    Squats   1x10  1x15  1x20    LAQ  5\" 1x15  5\" 1x15  5\" x 20 5\"x20    QS HEP 5\"x 15  5\" x 15  5\"x 20  5\"x 20   Heel slides  HEP 1x 15  1x15  1x20  1x20    Hip add  5\"x 15  5\" x 15  5\"x 20  5\" x20   Hip abd  Red 1x15  Red 1x15  Red 1x20  Red 1x20    SLR  1x15  1x15  2x10  2x10    S/L SLR        Clamshells        Bridges   " "1x15  1x15  2x10  2x10    Self calf stretch  20\"x 3 20\"x 3  20\"x 3  20\"x 3    Self HS stretch   20\"x 3  20\"x 3  20\"x 3  20\" x 3            Ther Activity        Step-ups  Fwd/Lat   C Fwd/Lat  1x15 ea  C Fwd/Lat  1x15 ea Juan C Fwd/Lat   1x20 ea Juan    Step-down   C 1x15  C 1x15 Juan  C 1x20 Juan     Reviewed activity modification and use of CP to decreased inflammatory cycle                Gait Training                        Modalities        CP prn                               "

## 2024-08-19 ENCOUNTER — APPOINTMENT (OUTPATIENT)
Dept: PHYSICAL THERAPY | Facility: HOME HEALTHCARE | Age: 29
End: 2024-08-19
Payer: COMMERCIAL

## 2024-08-22 ENCOUNTER — APPOINTMENT (OUTPATIENT)
Dept: PHYSICAL THERAPY | Facility: HOME HEALTHCARE | Age: 29
End: 2024-08-22
Payer: COMMERCIAL

## 2024-08-26 ENCOUNTER — APPOINTMENT (OUTPATIENT)
Dept: PHYSICAL THERAPY | Facility: HOME HEALTHCARE | Age: 29
End: 2024-08-26
Payer: COMMERCIAL

## 2024-08-29 ENCOUNTER — APPOINTMENT (OUTPATIENT)
Dept: PHYSICAL THERAPY | Facility: HOME HEALTHCARE | Age: 29
End: 2024-08-29
Payer: COMMERCIAL

## 2024-09-12 DIAGNOSIS — I10 ESSENTIAL HYPERTENSION: ICD-10-CM

## 2024-09-12 RX ORDER — LOSARTAN POTASSIUM 25 MG/1
50 TABLET ORAL DAILY
OUTPATIENT
Start: 2024-09-12

## 2024-09-12 RX ORDER — LOSARTAN POTASSIUM 25 MG/1
50 TABLET ORAL DAILY
Qty: 60 TABLET | Refills: 0 | Status: SHIPPED | OUTPATIENT
Start: 2024-09-12

## 2024-09-12 NOTE — TELEPHONE ENCOUNTER
Pt made aware but would not schedule at this time. Pt stated I don't know if I will be able to make it then. I am trying to get a car with in the month. This nurse said, maybe a neighbor or a friend could give him a ride for an appt, he said when I am able I will call, then hung up on this nurse.